# Patient Record
(demographics unavailable — no encounter records)

---

## 2024-10-22 NOTE — CURRENT PSYCHIATRIC SYMPTOMS
[Insomnia] : insomnia [Excessive Worry] : excessive worry [Depressed Mood] : no depressed mood [Anhedonia] : no anhedonia [Guilt] : not feeling guilty [Decreased Concentration] : no decrease in concentrating ability [Hyperphagia] : no hyperphagia [Hypersomnia] : no ~T hypersomnia [Psychomotor Retardation] : no psychomotor retardation [Anorexia] : no anorexia [Euphoria] : no euphoria [Highly Irritable] : no high irritability [Increased Activity] : no increased in activity [Distractibility] : not distracted [Talkativeness] : no talkativeness [Grandeur] : no feelings of grandeur [Buying Sprees] : no buying sprees [Hypersexuality] : denied hypersexuality [Dec Need For Sleep] : no decreased need for sleep [Delusions] : no ~T delusions [Hallucination Visual] : no visual hallucinations [Hallucination Auditory] : no auditory hallucinations [Hallucination Tactile] : no tactile hallucinations [Thought Disorder] : ~T a thought disorder was not noted [Ruminations] : no rumination disorder [Obsessions] : no obsessions [Re-experiencing] : no re-experiencing [Restlessness] : no restlessness [Hypochondriasis] : no hypochondriasis [Panic] : no panic disorder [de-identified] : chronic, sever [de-identified] : less anxious, no panic attacks

## 2024-10-22 NOTE — PHYSICAL EXAM
[None] : none [Normal] : good [3 - Mildly ill] : 3 - Mildly ill  (clearly established symptoms with minimal, if any, distress or difficulty in social and occupational function) [Anxious] : no anxious [Dysphoric] : not dysphoric [Constricted] : not constricted [FreeTextEntry8] : ok [FreeTextEntry9] : little dysphoric [2 - Much improved] : 2 - Much improved  (notably better with significant reduction of symptoms; increase in the level of functioning but some symptoms remain)

## 2024-10-22 NOTE — HISTORY OF PRESENT ILLNESS
[No] : no [None Known] : none known [Responsibility to family or others] : responsibility to family or others [Identifies reasons for living] : identifies reasons for living [Future oriented] : future oriented [Supportive social network or family] : supportive social network or family [High spirituality] : high spirituality [Positive therapeutic relationships] : positive therapeutic relationships [FreeTextEntry1] : This is a 61 year old patient who presents for medication management.  The patient reports fairly stable mood,  sleep is ok appetite.  The patient denies symptoms of depression,  ezekiel, or psychosis at this time.  The patient reports much less anxiety that impairs their daily functioning. The patient denies any suicidal ideation, homicidal ideation, no auditory or visual hallucinations, or paranoid ideation.  The patient has chronic medical complaints. The patient denies any drug or alcohol use, but is smoking at this time. I requested the patient's updated physical and labs from their PCP.  Coping skills were discussed and a safety plan was provided.  The patient was educated on the risks, benefits, and alternatives of their psychiatric medications.  The patient will try to reengage in psychotherapy at this time.  The patient consents to ongoing medication management.  Discussed sleep hygiene at length.  patient failed trials of trazodone, seroquel, vistaril.  Prefers to continue medications.  Due to Factor V Leiden mutation, unable to augment with risperdal/olanzapine  Patient provided PHQ9 and Perez Anxiety Scale.  8/30:patient maintains her lorazepam is causing restless leg, prefers to change back to valium, RX sent 10mg in am and 20mg at bedtime  9/13:no changes, at baseline 10/11:will try lowering celexa to 20mg, resume trazodone 100mg-200mg at bedtime for insomnia 11/8:patient will d/c celexa, raise remeron 30mg at bedtime, continue trazodone and valium 12/6:continue current medications 1/13:stable on medications, encouraged to go to ER for medical condition 2/8:see plan below for insomnia treatment 2/14:PA for zoplidem denied, will continue prn valium in day and xanax 1-2mg at bedtime for insomnia (educated that xanax will work better with good sleep hygiene and when valium is used less frequently) understand not to combine at bedtime and not to use with opiates as she has d/c opiates 3/8/23:patient maintains she can not take SSRIs, TCAs, atypicals due to side effects or treatment failure.  Patient educated that reducing anxiety with standing medication may help, but she maintains nightime prns have been only helpful options.  Discussed at great length other possibilities including depakote, prazozin, thorazine.  I also encouraged her to seek a second opinion  3/14:no improvement, now feels haldol was helpful in past, d/c thorazine and start haldol 5mg at bedtime, f/u tomorrow  3/22:haldol provided a little benefit, offrede depakote if able to do labs, states she can't at this time, will continue sleep hygiene and current medications, will increase in haldo, f/u in 3 weeks, earlier as needed, educated need to come in person by 5/11 to continue to receive controlled drugs  6/1:d/c haldol and trazodone due to side effects 6/29:stable on current medications 9/18:continue montly prns for anxiety/insomia 10/24:d/c prasozin 11/21:continue current medications, understands risks with opiates, has used concurrently, does not take any extra medication 1/22:at baseline 2/21:resume additional sleep medication, see plan 3/18:no changes [de-identified] : \par  \par
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
## 2024-10-22 NOTE — REASON FOR VISIT
[Telehealth (audio & video) - Individual/Group] : This visit was provided via telehealth using real-time 2-way audio visual technology. [Patient's space is appropriate for telehealth and maintains privacy/confidentiality.] : Patient's space is appropriate for telehealth and maintains privacy/confidentiality. [Verbal consent obtained from patient/other participant(s)] : Verbal consent for telehealth/telephonic services obtained from patient/other participant(s) [Home] : at home, [unfilled] , at the time of the visit. [Medical Office: ([facility])___] : at the medical office located in  [Verbal consent obtained from patient] : the patient, [unfilled] [FreeTextEntry4] : 300pm [FreeTextEntry5] : 315pm [FreeTextEntry2] : 6/23, ISTOP checked [FreeTextEntry3] : phone, then video [FreeTextEntry1] : "I am ok, prediabetic. I am much less anxious, chronic insomnia."

## 2024-10-22 NOTE — PLAN
[FreeTextEntry4] : mood is fairly stable, anxiety is chronic, improved sleep  insomnia is chronic, improved, due to federal law case, made many medication adjustments, minimal improvement, will continue with plan as above,   alternatives offered pending updated labs  health problems improved, has appropriate follow up  patient reports chronic severe PTSD/anxiety/insomnia, has exhausted many pharmacologic options, will continue to provide combinations to address   symptoms and encourage her to seek 2nd opinion as well

## 2024-10-22 NOTE — PAST MEDICAL HISTORY
[FreeTextEntry1] : past meds: Prozac, remeron, Abilify, Zoloft, Trazadone, Ambien, Seroquel for sleep, briefly tried Cymbalta and Zoloft, but did not tolerate and self-discontinued

## 2024-10-22 NOTE — DISCUSSION/SUMMARY
[Date of Last Physical Exam: _____] : Date of Last Physical Exam: [unfilled] [Date of Last Annual Labs: _____] : Date of Last Annual Labs: [unfilled] [Annual Review of Systems Completed?] : Annual Review of Systems Completed: Yes [Tobacco Screening Completed?] : Tobacco Screening Completed: Yes [Date of Last HbgA1c: _____] : Date of Last HbgA1c: [unfilled] [Date of Last Lipid Profile: _____] : Date of Last Lipid Profile: [unfilled] [Potential impact of patientâ??s physical health conditions on psychiatric care?] : Potential impact of patient's physical health conditions on psychiatric care: No [Does patient require any additional health services or referrals?] : Does patient require any additional health services or referrals: No

## 2024-10-22 NOTE — ASSESSMENT
[FreeTextEntry1] : Patient with chronic anxiety and insomnia due to psychosocial stressors, ongoing PTSD symptoms.  Discussed safe as possible combination of medications for insomnia as below, to add on 1 medication at a time, never take extra, and make changes in step wise approach   1. valium 10mg BID prn anxiety/insomnia 2. melatonin 5mg-10mg at bedtime 3. hydroxyzine 25mg-75mg at bedtime prn 4. alprazolam 2mg at bedtime 5.. trazodone 100mg-200mg at bedtime, not using    follow up in 4 weeks,

## 2024-10-25 NOTE — HISTORY OF PRESENT ILLNESS
[FreeTextEntry1] : patient on high dose narcotic therapy, consequently sedated also on other psych meds with similar action, now wants weight loss meds

## 2024-11-05 NOTE — HISTORY OF PRESENT ILLNESS
[de-identified] : Ms. PRUITT has a past medical history of factor V Leiden, thrombophilia, protein C and S deficiency currently under the care of hematology, along with diabetes, diabetic neuropathy, dyslipidemia, mental health disorders, and history of multiple DVT's in the past.   She has been experiencing chronic lower back pain since October 2023.  At the time she injured her back while opening a window.  After the injury she underwent conservative management.  In April 2024 she had a CAT scan and an MRI of the lumbar spine performed at [facility].  The studies were reviewed today:   - MRI / CT lumbar spine 4/2024: diffuse lumbar degenerative disease with degenerative scoliosis.  Foraminal stenosis appreciated at L1-2 and L5-S1.  Subacute/chronic compression deformity of L2 also noted.   - EMG 11/2023: diffuse sensorimotor polyneuropathy secondary to her known history of diabetes.  Recently she was seen by Dr. Rees her PCP who recommended that she restart physical therapy.  She is awaiting insurance approval to proceed.  She is on multiple medications as per her chart.  Due to her hematology condition's, she was advised by her specialist that she is at high risk for postoperative complications, therefore, surgical intervention should be only considered if there was an urgent indication to proceed.    PHYSICAL EXAM:  Constitutional: Well appearing, no distress HEENT: Normocephalic Atraumatic Psychiatric: Alert and oriented to all spheres, normal mood Pulmonary: No respiratory distress Neurologic:  CN II-XII grossly intact Palpation: + lumbar paraspinal tenderness.  Strength: Full strength in all major muscle groups Sensation: Full sensation to light touch in all extremities Reflexes:   2+ patellar  2+ ankle jerk Mild restriction in ROM  Signs: SLR negative Gait: steady, walking w/o assistance.

## 2024-11-05 NOTE — ASSESSMENT
[FreeTextEntry1] : Ms. PRUITT will continue with conservative management.   We are not considering structural surgery at this time.   I am referring her to see Dr. Bran to establish care with regards to chronic pain management at this time.   She may follow up with me as needed.  All questions answered today.    I personally reviewed with the PA, this patient's history and physical exam findings, as documented above. I have discussed the relevant areas of concern, having direct implications to the presenting problems and illnesses, and I have personally examined all pertinent and positive and negative findings, which impact their neurosurgical treatment.  MS DEBRA Ramos-C Senior Physician Assistant [facility]  Marques Mary MD FAANS Director, Complex & Adult Spinal Deformity Surgery [facility]

## 2024-11-19 NOTE — ASSESSMENT
[FreeTextEntry1] : Patient with chronic anxiety and insomnia due to psychosocial stressors, ongoing PTSD symptoms.  Discussed safe as possible combination of medications for insomnia as below, to add on 1 medication at a time, never take extra, and make changes in step wise approach   1. valium 10mg BID prn anxiety/insomnia, will d/c and replace with alprazolam only 2. melatonin 5mg-10mg at bedtime 3. hydroxyzine 25mg-75mg at bedtime prn 4. alprazolam 2mg at bedtime, change to 1mg BID prn and 2mg-4mg at bedtime   follow up in 4 weeks,

## 2024-11-19 NOTE — CURRENT PSYCHIATRIC SYMPTOMS
[Insomnia] : insomnia [Excessive Worry] : excessive worry [Depressed Mood] : no depressed mood [Anhedonia] : no anhedonia [Guilt] : not feeling guilty [Decreased Concentration] : no decrease in concentrating ability [Hyperphagia] : no hyperphagia [Hypersomnia] : no ~T hypersomnia [Psychomotor Retardation] : no psychomotor retardation [Anorexia] : no anorexia [Euphoria] : no euphoria [Highly Irritable] : no high irritability [Increased Activity] : no increased in activity [Distractibility] : not distracted [Talkativeness] : no talkativeness [Grandeur] : no feelings of grandeur [Buying Sprees] : no buying sprees [Hypersexuality] : denied hypersexuality [Dec Need For Sleep] : no decreased need for sleep [Delusions] : no ~T delusions [Hallucination Visual] : no visual hallucinations [Hallucination Auditory] : no auditory hallucinations [Hallucination Tactile] : no tactile hallucinations [Thought Disorder] : ~T a thought disorder was not noted [Ruminations] : no rumination disorder [Obsessions] : no obsessions [Re-experiencing] : no re-experiencing [Restlessness] : no restlessness [Hypochondriasis] : no hypochondriasis [Panic] : no panic disorder [de-identified] : chronic, sever [de-identified] : less anxious, no panic attacks

## 2024-11-19 NOTE — HISTORY OF PRESENT ILLNESS
[No] : no [None Known] : none known [Responsibility to family or others] : responsibility to family or others [Identifies reasons for living] : identifies reasons for living [Future oriented] : future oriented [Supportive social network or family] : supportive social network or family [High spirituality] : high spirituality [Positive therapeutic relationships] : positive therapeutic relationships [FreeTextEntry1] : This is a 62 year old patient who presents for medication management.  The patient reports fairly stable mood,  sleep is ok appetite.  The patient denies symptoms of depression,  ezekiel, or psychosis at this time.  The patient reports much less anxiety that impairs their daily functioning. The patient denies any suicidal ideation, homicidal ideation, no auditory or visual hallucinations, or paranoid ideation.  The patient has chronic medical complaints. The patient denies any drug or alcohol use, but is smoking at this time. I requested the patient's updated physical and labs from their PCP.  Coping skills were discussed and a safety plan was provided.  The patient was educated on the risks, benefits, and alternatives of their psychiatric medications.  The patient will try to reengage in psychotherapy at this time.  The patient consents to ongoing medication management.  Discussed sleep hygiene at length.  patient failed trials of trazodone, seroquel, vistaril.   Due to Factor V Leiden mutation, unable to augment with risperdal/olanzapine  Patient provided PHQ9 and Perez Anxiety Scale.  8/30:patient maintains her lorazepam is causing restless leg, prefers to change back to valium, RX sent 10mg in am and 20mg at bedtime  9/13:no changes, at baseline 10/11:will try lowering celexa to 20mg, resume trazodone 100mg-200mg at bedtime for insomnia 11/8:patient will d/c celexa, raise remeron 30mg at bedtime, continue trazodone and valium 12/6:continue current medications 1/13:stable on medications, encouraged to go to ER for medical condition 2/8:see plan below for insomnia treatment 2/14:PA for zoplidem denied, will continue prn valium in day and xanax 1-2mg at bedtime for insomnia (educated that xanax will work better with good sleep hygiene and when valium is used less frequently) understand not to combine at bedtime and not to use with opiates as she has d/c opiates 3/8/23:patient maintains she can not take SSRIs, TCAs, atypicals due to side effects or treatment failure.  Patient educated that reducing anxiety with standing medication may help, but she maintains nightime prns have been only helpful options.  Discussed at great length other possibilities including depakote, prazozin, thorazine.  I also encouraged her to seek a second opinion  3/14:no improvement, now feels haldol was helpful in past, d/c thorazine and start haldol 5mg at bedtime, f/u tomorrow  3/22:haldol provided a little benefit, sienna depakote if able to do labs, states she can't at this time, will continue sleep hygiene and current medications, will increase in haldo, f/u in 3 weeks, earlier as needed, educated need to come in person by 5/11 to continue to receive controlled drugs  6/1:d/c haldol and trazodone due to side effects 6/29:stable on current medications 9/18:continue montly prns for anxiety/insomia 10/24:d/c prasozin 11/21:continue current medications, understands risks with opiates, has used concurrently, does not take any extra medication 1/22:at baseline 2/21:resume additional sleep medication, see plan 3/18:no changes 11/19 see plan [de-identified] : \par  \par

## 2024-11-19 NOTE — REASON FOR VISIT
[Telehealth (audio & video) - Individual/Group] : This visit was provided via telehealth using real-time 2-way audio visual technology. [Patient's space is appropriate for telehealth and maintains privacy/confidentiality.] : Patient's space is appropriate for telehealth and maintains privacy/confidentiality. [Verbal consent obtained from patient/other participant(s)] : Verbal consent for telehealth/telephonic services obtained from patient/other participant(s) [Home] : at home, [unfilled] , at the time of the visit. [Medical Office: ([facility])___] : at the medical office located in  [Verbal consent obtained from patient] : the patient, [unfilled] [FreeTextEntry4] : 247pm [FreeTextEntry5] : 302pm [FreeTextEntry2] : 6/23, ISTOP checked [FreeTextEntry1] : "I am ok, had elevated liver enzymes and low HR,"

## 2024-11-19 NOTE — PHYSICAL EXAM
[None] : none [Normal] : good [3 - Mildly ill] : 3 - Mildly ill  (clearly established symptoms with minimal, if any, distress or difficulty in social and occupational function) [3 - Minimally improved] : 3 - Minimally improved  (slightly better with little or no clinically meaningful reduction of symptoms. Represents very little change in basic clinical status, level of care, or functional capacity) [Anxious] : no anxious [Dysphoric] : not dysphoric [Constricted] : not constricted [FreeTextEntry8] : ok [FreeTextEntry9] : little dysphoric

## 2024-11-19 NOTE — DISCUSSION/SUMMARY
[Date of Last Physical Exam: _____] : Date of Last Physical Exam: [unfilled] [Date of Last Annual Labs: _____] : Date of Last Annual Labs: [unfilled] [Annual Review of Systems Completed?] : Annual Review of Systems Completed: Yes [Tobacco Screening Completed?] : Tobacco Screening Completed: Yes [Date of Last HbgA1c: _____] : Date of Last HbgA1c: [unfilled] [Date of Last Lipid Profile: _____] : Date of Last Lipid Profile: [unfilled] [Potential impact of patientÃ¢Â?Â?s physical health conditions on psychiatric care?] : Potential impact of patient's physical health conditions on psychiatric care: No [Does patient require any additional health services or referrals?] : Does patient require any additional health services or referrals: No

## 2024-12-17 NOTE — PHYSICAL EXAM
[None] : none [Normal] : good [3 - Mildly ill] : 3 - Mildly ill  (clearly established symptoms with minimal, if any, distress or difficulty in social and occupational function) [Anxious] : no anxious [Dysphoric] : not dysphoric [Constricted] : not constricted [FreeTextEntry8] : ok [FreeTextEntry9] : little dysphoric [3 - Minimally improved] : 3 - Minimally improved  (slightly better with little or no clinically meaningful reduction of symptoms. Represents very little change in basic clinical status, level of care, or functional capacity)

## 2024-12-17 NOTE — HISTORY OF PRESENT ILLNESS
[No] : no [None Known] : none known [Responsibility to family or others] : responsibility to family or others [Identifies reasons for living] : identifies reasons for living [Future oriented] : future oriented [Supportive social network or family] : supportive social network or family [High spirituality] : high spirituality [Positive therapeutic relationships] : positive therapeutic relationships [FreeTextEntry1] : This is a 62 year old patient who presents for medication management.  The patient reports fairly stable mood,  sleep is ok appetite.  The patient denies symptoms of depression,  ezekiel, or psychosis at this time.  The patient reports much less anxiety that impairs their daily functioning. The patient denies any suicidal ideation, homicidal ideation, no auditory or visual hallucinations, or paranoid ideation.  The patient has chronic medical complaints. The patient denies any drug or alcohol use, but is smoking at this time. I requested the patient's updated physical and labs from their PCP.  Coping skills were discussed and a safety plan was provided.  The patient was educated on the risks, benefits, and alternatives of their psychiatric medications.  The patient will try to reengage in psychotherapy at this time.  The patient consents to ongoing medication management.  Discussed sleep hygiene at length.  patient failed trials of trazodone, seroquel, vistaril.   Due to Factor V Leiden mutation, unable to augment with risperdal/olanzapine  Patient provided PHQ9 and Perez Anxiety Scale.  8/30:patient maintains her lorazepam is causing restless leg, prefers to change back to valium, RX sent 10mg in am and 20mg at bedtime  9/13:no changes, at baseline 10/11:will try lowering celexa to 20mg, resume trazodone 100mg-200mg at bedtime for insomnia 11/8:patient will d/c celexa, raise remeron 30mg at bedtime, continue trazodone and valium 12/6:continue current medications 1/13:stable on medications, encouraged to go to ER for medical condition 2/8:see plan below for insomnia treatment 2/14:PA for zoplidem denied, will continue prn valium in day and xanax 1-2mg at bedtime for insomnia (educated that xanax will work better with good sleep hygiene and when valium is used less frequently) understand not to combine at bedtime and not to use with opiates as she has d/c opiates 3/8/23:patient maintains she can not take SSRIs, TCAs, atypicals due to side effects or treatment failure.  Patient educated that reducing anxiety with standing medication may help, but she maintains nightime prns have been only helpful options.  Discussed at great length other possibilities including depakote, prazozin, thorazine.  I also encouraged her to seek a second opinion  3/14:no improvement, now feels haldol was helpful in past, d/c thorazine and start haldol 5mg at bedtime, f/u tomorrow  3/22:haldol provided a little benefit, sienna depakote if able to do labs, states she can't at this time, will continue sleep hygiene and current medications, will increase in haldo, f/u in 3 weeks, earlier as needed, educated need to come in person by 5/11 to continue to receive controlled drugs  6/1:d/c haldol and trazodone due to side effects 6/29:stable on current medications 9/18:continue montly prns for anxiety/insomia 10/24:d/c prasozin 11/21:continue current medications, understands risks with opiates, has used concurrently, does not take any extra medication 1/22:at baseline 2/21:resume additional sleep medication, see plan 3/18:no changes 11/19 see plan 12/17:continue current meds [de-identified] : \par  \par

## 2024-12-17 NOTE — CURRENT PSYCHIATRIC SYMPTOMS
[Insomnia] : insomnia [Excessive Worry] : excessive worry [Depressed Mood] : no depressed mood [Anhedonia] : no anhedonia [Guilt] : not feeling guilty [Decreased Concentration] : no decrease in concentrating ability [Hyperphagia] : no hyperphagia [Hypersomnia] : no ~T hypersomnia [Psychomotor Retardation] : no psychomotor retardation [Anorexia] : no anorexia [Euphoria] : no euphoria [Highly Irritable] : no high irritability [Increased Activity] : no increased in activity [Distractibility] : not distracted [Talkativeness] : no talkativeness [Grandeur] : no feelings of grandeur [Buying Sprees] : no buying sprees [Hypersexuality] : denied hypersexuality [Dec Need For Sleep] : no decreased need for sleep [Delusions] : no ~T delusions [Hallucination Visual] : no visual hallucinations [Hallucination Auditory] : no auditory hallucinations [Hallucination Tactile] : no tactile hallucinations [Thought Disorder] : ~T a thought disorder was not noted [Ruminations] : no rumination disorder [Obsessions] : no obsessions [Re-experiencing] : no re-experiencing [Restlessness] : no restlessness [Hypochondriasis] : no hypochondriasis [Panic] : no panic disorder [de-identified] : chronic, sever [de-identified] : less anxious, no panic attacks

## 2024-12-17 NOTE — ASSESSMENT
[FreeTextEntry1] : Patient with chronic anxiety and insomnia due to psychosocial stressors, ongoing PTSD symptoms.  Discussed safe as possible combination of medications for insomnia as below, to add on 1 medication at a time, never take extra, and make changes in step wise approach   1. valium 10mg BID prn anxiety/insomnia was d/c 2. melatonin 5mg-10mg at bedtime 3. hydroxyzine 25mg-75mg at bedtime prn 4. alprazolam 1mg BID prn and 2mg-4mg at bedtime   follow up in 4 weeks,

## 2024-12-17 NOTE — REASON FOR VISIT
[Telehealth (audio & video) - Individual/Group] : This visit was provided via telehealth using real-time 2-way audio visual technology. [Patient's space is appropriate for telehealth and maintains privacy/confidentiality.] : Patient's space is appropriate for telehealth and maintains privacy/confidentiality. [Verbal consent obtained from patient/other participant(s)] : Verbal consent for telehealth/telephonic services obtained from patient/other participant(s) [Home] : at home, [unfilled] , at the time of the visit. [Medical Office: ([facility])___] : at the medical office located in  [Verbal consent obtained from patient] : the patient, [unfilled] [FreeTextEntry4] : 250pm [FreeTextEntry5] : 305pm [FreeTextEntry2] : 6/23, ISTOP checked [FreeTextEntry1] : "I am ok, adjusting to the changes."

## 2025-01-14 NOTE — PHYSICAL EXAM
[None] : none [Normal] : good [3 - Mildly ill] : 3 - Mildly ill  (clearly established symptoms with minimal, if any, distress or difficulty in social and occupational function) [4 - No change] : 4 - No change  (symptoms remain essentially unchanged) [Anxious] : no anxious [Dysphoric] : not dysphoric [Constricted] : not constricted [FreeTextEntry8] : ok [FreeTextEntry9] : little dysphoric

## 2025-01-14 NOTE — CURRENT PSYCHIATRIC SYMPTOMS
[Insomnia] : insomnia [Excessive Worry] : excessive worry [Depressed Mood] : no depressed mood [Anhedonia] : no anhedonia [Guilt] : not feeling guilty [Decreased Concentration] : no decrease in concentrating ability [Hyperphagia] : no hyperphagia [Hypersomnia] : no ~T hypersomnia [Psychomotor Retardation] : no psychomotor retardation [Anorexia] : no anorexia [Euphoria] : no euphoria [Highly Irritable] : no high irritability [Increased Activity] : no increased in activity [Distractibility] : not distracted [Talkativeness] : no talkativeness [Grandeur] : no feelings of grandeur [Buying Sprees] : no buying sprees [Hypersexuality] : denied hypersexuality [Dec Need For Sleep] : no decreased need for sleep [Delusions] : no ~T delusions [Hallucination Visual] : no visual hallucinations [Hallucination Auditory] : no auditory hallucinations [Hallucination Tactile] : no tactile hallucinations [Thought Disorder] : ~T a thought disorder was not noted [Ruminations] : no rumination disorder [Obsessions] : no obsessions [Re-experiencing] : no re-experiencing [Restlessness] : no restlessness [Hypochondriasis] : no hypochondriasis [Panic] : no panic disorder [de-identified] : chronic, sever [de-identified] : less anxious, no panic attacks

## 2025-01-14 NOTE — ASSESSMENT
[FreeTextEntry1] : Patient with chronic anxiety and insomnia due to psychosocial stressors, ongoing PTSD symptoms.  Discussed safe as possible combination of medications for insomnia as below, to add on 1 medication at a time, never take extra, and make changes in step wise approach   1. melatonin 5mg-10mg at bedtime 2. hydroxyzine 25mg-75mg at bedtime prn 3. alprazolam 1mg BID prn and 2mg-4mg at bedtime   follow up in 4 weeks,

## 2025-01-14 NOTE — HISTORY OF PRESENT ILLNESS
[No] : no [None Known] : none known [Responsibility to family or others] : responsibility to family or others [Identifies reasons for living] : identifies reasons for living [Future oriented] : future oriented [Supportive social network or family] : supportive social network or family [High spirituality] : high spirituality [Positive therapeutic relationships] : positive therapeutic relationships [FreeTextEntry1] : This is a 62 year old patient who presents for medication management.  The patient reports fairly stable mood,  sleep is ok appetite.  The patient denies symptoms of depression,  ezekiel, or psychosis at this time.  The patient reports much less anxiety that impairs their daily functioning. The patient denies any suicidal ideation, homicidal ideation, no auditory or visual hallucinations, or paranoid ideation.  The patient has chronic medical complaints. The patient denies any drug or alcohol use, but is smoking at this time. I requested the patient's updated physical and labs from their PCP.  Coping skills were discussed and a safety plan was provided.  The patient was educated on the risks, benefits, and alternatives of their psychiatric medications.  The patient will try to reengage in psychotherapy at this time.  The patient consents to ongoing medication management.  Discussed sleep hygiene at length.  patient failed trials of trazodone, seroquel, vistaril.   Due to Factor V Leiden mutation, unable to augment with risperdal/olanzapine  Patient provided PHQ9 and Perez Anxiety Scale.  8/30:patient maintains her lorazepam is causing restless leg, prefers to change back to valium, RX sent 10mg in am and 20mg at bedtime  9/13:no changes, at baseline 10/11:will try lowering celexa to 20mg, resume trazodone 100mg-200mg at bedtime for insomnia 11/8:patient will d/c celexa, raise remeron 30mg at bedtime, continue trazodone and valium 12/6:continue current medications 1/13:stable on medications, encouraged to go to ER for medical condition 2/8:see plan below for insomnia treatment 2/14:PA for zoplidem denied, will continue prn valium in day and xanax 1-2mg at bedtime for insomnia (educated that xanax will work better with good sleep hygiene and when valium is used less frequently) understand not to combine at bedtime and not to use with opiates as she has d/c opiates 3/8/23:patient maintains she can not take SSRIs, TCAs, atypicals due to side effects or treatment failure.  Patient educated that reducing anxiety with standing medication may help, but she maintains nightime prns have been only helpful options.  Discussed at great length other possibilities including depakote, prazozin, thorazine.  I also encouraged her to seek a second opinion  3/14:no improvement, now feels haldol was helpful in past, d/c thorazine and start haldol 5mg at bedtime, f/u tomorrow  3/22:haldol provided a little benefit, sienna depakote if able to do labs, states she can't at this time, will continue sleep hygiene and current medications, will increase in haldo, f/u in 3 weeks, earlier as needed, educated need to come in person by 5/11 to continue to receive controlled drugs  6/1:d/c haldol and trazodone due to side effects 6/29:stable on current medications 9/18:continue montly prns for anxiety/insomia 10/24:d/c prasozin 11/21:continue current medications, understands risks with opiates, has used concurrently, does not take any extra medication 1/22:at baseline 2/21:resume additional sleep medication, see plan 3/18:no changes 11/19 see plan 12/17:continue current meds 1/14:no changes, emailed GAD7/PHQ9 [de-identified] : \par  \par

## 2025-01-14 NOTE — REASON FOR VISIT
[Telehealth (audio & video) - Individual/Group] : This visit was provided via telehealth using real-time 2-way audio visual technology. [Patient's space is appropriate for telehealth and maintains privacy/confidentiality.] : Patient's space is appropriate for telehealth and maintains privacy/confidentiality. [Verbal consent obtained from patient/other participant(s)] : Verbal consent for telehealth/telephonic services obtained from patient/other participant(s) [Home] : at home, [unfilled] , at the time of the visit. [Medical Office: ([facility])___] : at the medical office located in  [Verbal consent obtained from patient] : the patient, [unfilled] [FreeTextEntry4] : 250pm [FreeTextEntry5] : 305pm [FreeTextEntry2] : 6/23, ISTOP checked [FreeTextEntry1] : "I am ok, have bad cold."

## 2025-01-17 NOTE — BEGINNING OF VISIT
The patient is a pleasant 73-year-old gentleman history of seizures.  The patient has done  well with no further recurrence.  The patient was seen last year and at that time had been stable.  He continues with the lamotrigine 100 mg p.o. b.i.d.    Blood pressure 132/80, pulse 73, resp. rate 12, height 5' 6\" (1.676 m), weight 75.8 kg (167 lb), SpO2 98 %.    The patient is awake alert and pleasant.  He is oriented x4.  His speech and language are normal.  Cranial nerves 2-12 intact.  Fundi benign.  Motor exam shows 5/5 strength in both upper lower extremities.  Coordination and gait are normal.  Romberg is absent.    Impression:  This is a pleasant 73-year-old gentleman history of seizure disorder.  This point I would continue with the lamotrigine 100 mg p.o. b.i.d..  If all goes well I plan on seeing him  back in 1 year for follow-up.   [0] : 2) Feeling down, depressed, or hopeless: Not at all (0) [PHQ-2 Negative] : PHQ-2 Negative [[id number]] : 0 [Pain Scale: ___] : On a scale of 1-10, today the patient's pain is a(n) [unfilled]. [Current] : Current [Reviewed, no changes] : Reviewed, no changes

## 2025-01-17 NOTE — BEGINNING OF VISIT
[0] : 2) Feeling down, depressed, or hopeless: Not at all (0) [PHQ-2 Negative] : PHQ-2 Negative [[id number]] : 0 [Pain Scale: ___] : On a scale of 1-10, today the patient's pain is a(n) [unfilled]. [Current] : Current [Reviewed, no changes] : Reviewed, no changes

## 2025-01-17 NOTE — BEGINNING OF VISIT
[0] : 2) Feeling down, depressed, or hopeless: Not at all (0) [PHQ-2 Negative] : PHQ-2 Negative [[id number]] : 0 [Pain Scale: ___] : On a scale of 1-10, today the patient's pain is a(n) [unfilled]. [Current] : Current [Reviewed, no changes] : Reviewed, no changes

## 2025-01-17 NOTE — BEGINNING OF VISIT
[0] : 2) Feeling down, depressed, or hopeless: Not at all (0) [PHQ-2 Negative] : PHQ-2 Negative [[id number]] : 0 [Pain Scale: ___] : On a scale of 1-10, today the patient's pain is a(n) [unfilled]. [Current] : Current [Reviewed, no changes] : Reviewed, no changes

## 2025-01-18 NOTE — HISTORY OF PRESENT ILLNESS
[Disease:__________________________] : Disease: [unfilled] [de-identified] : The patient is coming for a follow up for her thrombophilia. She is feeling well overall, admits to being nervous about her "new clots". Apparently she had LE US done at a facility in [place] and was told to see her hematologist "because she was developing new clots". Not clear what the specific reasons were for the examination. Recent US from 4/24 and 12/24 reviewed and showed no evidence of acute DVT.  In fact, her US from 2011 read exactly the same. She remains on Pradaxa 150 mg daily.   The patient is up to date with mammogram (done 9/24 WNL), and PAP, colonoscopy scheduled for April 2025.     [Date: ____________] : Patient's last distress assessment performed on [unfilled]. [5 - Distress Level] : Distress Level: 5 [ECOG Performance Status: 1 - Restricted in physically strenuous activity but ambulatory and able to carry out work of a light or sedentary nature] : Performance Status: 1 - Restricted in physically strenuous activity but ambulatory and able to carry out work of a light or sedentary nature, e.g., light house work, office work

## 2025-01-18 NOTE — REASON FOR VISIT
[Follow-Up Visit] : a follow-up visit for [Family Member] : family member [FreeTextEntry2] : Thrombophilia

## 2025-01-18 NOTE — END OF VISIT
[] : Fellow [FreeTextEntry3] : I was present with the PA during the key portions of the history and exam. I agree with the findings and plan as documented in the PA's note, unless noted below.\par

## 2025-01-18 NOTE — PHYSICAL EXAM
[Restricted in physically strenuous activity but ambulatory and able to carry out work of a light or sedentary nature] : Status 1- Restricted in physically strenuous activity but ambulatory and able to carry out work of a light or sedentary nature, e.g., light house work, office work [Obese] : obese [Normal] : affect appropriate [de-identified] : +Clubbing, decreased BS

## 2025-01-18 NOTE — REVIEW OF SYSTEMS
[Fatigue] : fatigue [Vision Problems] : vision problems [SOB on Exertion] : shortness of breath during exertion [Joint Pain] : joint pain [Dizziness] : dizziness [Insomnia] : insomnia [Depression] : depression [Easy Bruising] : a tendency for easy bruising [Negative] : Endocrine [Recent Change In Weight] : ~T no recent weight change [Incontinence] : no incontinence [FreeTextEntry9] : Back, legs [de-identified] : Under her psychiatrist's care

## 2025-01-18 NOTE — HISTORY OF PRESENT ILLNESS
[Disease:__________________________] : Disease: [unfilled] [de-identified] : The patient is coming for a follow up for her thrombophilia. She is feeling well overall, admits to being nervous about her "new clots". Apparently she had LE US done at a facility in [place] and was told to see her hematologist "because she was developing new clots". Not clear what the specific reasons were for the examination. Recent US from 4/24 and 12/24 reviewed and showed no evidence of acute DVT.  In fact, her US from 2011 read exactly the same. She remains on Pradaxa 150 mg daily.   The patient is up to date with mammogram (done 9/24 WNL), and PAP, colonoscopy scheduled for April 2025.     [Date: ____________] : Patient's last distress assessment performed on [unfilled]. [5 - Distress Level] : Distress Level: 5 [ECOG Performance Status: 1 - Restricted in physically strenuous activity but ambulatory and able to carry out work of a light or sedentary nature] : Performance Status: 1 - Restricted in physically strenuous activity but ambulatory and able to carry out work of a light or sedentary nature, e.g., light house work, office work

## 2025-01-18 NOTE — PHYSICAL EXAM
[Restricted in physically strenuous activity but ambulatory and able to carry out work of a light or sedentary nature] : Status 1- Restricted in physically strenuous activity but ambulatory and able to carry out work of a light or sedentary nature, e.g., light house work, office work [Obese] : obese [Normal] : affect appropriate [de-identified] : +Clubbing, decreased BS

## 2025-01-18 NOTE — HISTORY OF PRESENT ILLNESS
[Disease:__________________________] : Disease: [unfilled] [de-identified] : The patient is coming for a follow up for her thrombophilia. She is feeling well overall, admits to being nervous about her "new clots". Apparently she had LE US done at a facility in [place] and was told to see her hematologist "because she was developing new clots". Not clear what the specific reasons were for the examination. Recent US from 4/24 and 12/24 reviewed and showed no evidence of acute DVT.  In fact, her US from 2011 read exactly the same. She remains on Pradaxa 150 mg daily.   The patient is up to date with mammogram (done 9/24 WNL), and PAP, colonoscopy scheduled for April 2025.     [Date: ____________] : Patient's last distress assessment performed on [unfilled]. [5 - Distress Level] : Distress Level: 5 [ECOG Performance Status: 1 - Restricted in physically strenuous activity but ambulatory and able to carry out work of a light or sedentary nature] : Performance Status: 1 - Restricted in physically strenuous activity but ambulatory and able to carry out work of a light or sedentary nature, e.g., light house work, office work

## 2025-01-18 NOTE — PHYSICAL EXAM
[Restricted in physically strenuous activity but ambulatory and able to carry out work of a light or sedentary nature] : Status 1- Restricted in physically strenuous activity but ambulatory and able to carry out work of a light or sedentary nature, e.g., light house work, office work [Obese] : obese [Normal] : affect appropriate [de-identified] : +Clubbing, decreased BS

## 2025-01-18 NOTE — PHYSICAL EXAM
[Restricted in physically strenuous activity but ambulatory and able to carry out work of a light or sedentary nature] : Status 1- Restricted in physically strenuous activity but ambulatory and able to carry out work of a light or sedentary nature, e.g., light house work, office work [Obese] : obese [Normal] : affect appropriate [de-identified] : +Clubbing, decreased BS

## 2025-01-18 NOTE — ASSESSMENT
[FreeTextEntry1] : 1. 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 She also had low protein ?C (which was normal on repeat testing), ?low protein S activity, but circumstances of the blood drawing not clear (whether on anticoagulation with Coumadin or not). At one time, she also had positive antiphospholipid antibodies.  The patient seems to be stable on Pradaxa which she is tolerating well with no new thromboembolic events.  2. Overweight, Patient was prescribed Ozempic, not yet started.  The patient has many medical, psychiatric and social issues.   She will keep all her appointments with all her other physicians. Labs today: repeat phospholipid antibodies RTC 1 year, or sooner if needed.  Case discussed and patient seen with Dr. Falcon who agreed with the above.

## 2025-01-18 NOTE — REVIEW OF SYSTEMS
[Fatigue] : fatigue [Vision Problems] : vision problems [SOB on Exertion] : shortness of breath during exertion [Joint Pain] : joint pain [Dizziness] : dizziness [Insomnia] : insomnia [Depression] : depression [Easy Bruising] : a tendency for easy bruising [Negative] : Endocrine [Recent Change In Weight] : ~T no recent weight change [Incontinence] : no incontinence [FreeTextEntry9] : Back, legs [de-identified] : Under her psychiatrist's care

## 2025-01-18 NOTE — REVIEW OF SYSTEMS
[Fatigue] : fatigue [Vision Problems] : vision problems [SOB on Exertion] : shortness of breath during exertion [Joint Pain] : joint pain [Dizziness] : dizziness [Insomnia] : insomnia [Depression] : depression [Easy Bruising] : a tendency for easy bruising [Negative] : Endocrine [Recent Change In Weight] : ~T no recent weight change [Incontinence] : no incontinence [FreeTextEntry9] : Back, legs [de-identified] : Under her psychiatrist's care

## 2025-01-18 NOTE — HISTORY OF PRESENT ILLNESS
[Disease:__________________________] : Disease: [unfilled] [de-identified] : The patient is coming for a follow up for her thrombophilia. She is feeling well overall, admits to being nervous about her "new clots". Apparently she had LE US done at a facility in [place] and was told to see her hematologist "because she was developing new clots". Not clear what the specific reasons were for the examination. Recent US from 4/24 and 12/24 reviewed and showed no evidence of acute DVT.  In fact, her US from 2011 read exactly the same. She remains on Pradaxa 150 mg daily.   The patient is up to date with mammogram (done 9/24 WNL), and PAP, colonoscopy scheduled for April 2025.     [Date: ____________] : Patient's last distress assessment performed on [unfilled]. [5 - Distress Level] : Distress Level: 5 [ECOG Performance Status: 1 - Restricted in physically strenuous activity but ambulatory and able to carry out work of a light or sedentary nature] : Performance Status: 1 - Restricted in physically strenuous activity but ambulatory and able to carry out work of a light or sedentary nature, e.g., light house work, office work

## 2025-03-09 NOTE — REASON FOR VISIT
[Telehealth (audio & video) - Individual/Group] : This visit was provided via telehealth using real-time 2-way audio visual technology. [Patient's space is appropriate for telehealth and maintains privacy/confidentiality.] : Patient's space is appropriate for telehealth and maintains privacy/confidentiality. [Verbal consent obtained from patient/other participant(s)] : Verbal consent for telehealth/telephonic services obtained from patient/other participant(s) [Home] : at home, [unfilled] , at the time of the visit. [Medical Office: ([facility])___] : at the medical office located in  [Verbal consent obtained from patient] : the patient, [unfilled] [FreeTextEntry4] : 340pm [FreeTextEntry2] : 6/23, ISTOP checked [FreeTextEntry5] : 400pm [FreeTextEntry3] : phone, then video [FreeTextEntry1] : "I want to change back to valium."

## 2025-03-09 NOTE — ASSESSMENT
[FreeTextEntry1] : Patient with chronic anxiety and insomnia due to psychosocial stressors, ongoing PTSD symptoms.  Discussed safe as possible combination of medications for insomnia as below, to add on 1 medication at a time, never take extra, and make changes in step wise approach   1. melatonin 5mg-10mg at bedtime 2. hydroxyzine 25mg-75mg at bedtime prn 3. alprazolam 1mg BID prn and 2mg-4mg at bedtime, will d/c and try lorazepam 2mg-4mg at bedtime (may use 1m daily prn if needed   follow up in 4 weeks,

## 2025-03-09 NOTE — REASON FOR VISIT
[Telehealth (audio & video) - Individual/Group] : This visit was provided via telehealth using real-time 2-way audio visual technology. [Patient's space is appropriate for telehealth and maintains privacy/confidentiality.] : Patient's space is appropriate for telehealth and maintains privacy/confidentiality. [Verbal consent obtained from patient/other participant(s)] : Verbal consent for telehealth/telephonic services obtained from patient/other participant(s) [Home] : at home, [unfilled] , at the time of the visit. [Medical Office: ([facility])___] : at the medical office located in  [Verbal consent obtained from patient] : the patient, [unfilled] [FreeTextEntry4] : 340pm [FreeTextEntry2] : 6/23, ISTOP checked [FreeTextEntry5] : 400pm [FreeTextEntry3] : phone, then video [FreeTextEntry1] : "I want to change back to valium."

## 2025-03-09 NOTE — CURRENT PSYCHIATRIC SYMPTOMS
[Insomnia] : insomnia [Excessive Worry] : excessive worry [Depressed Mood] : no depressed mood [Anhedonia] : no anhedonia [Guilt] : not feeling guilty [Decreased Concentration] : no decrease in concentrating ability [Hyperphagia] : no hyperphagia [Hypersomnia] : no ~T hypersomnia [Psychomotor Retardation] : no psychomotor retardation [Euphoria] : no euphoria [Anorexia] : no anorexia [Highly Irritable] : no high irritability [Increased Activity] : no increased in activity [Distractibility] : not distracted [Talkativeness] : no talkativeness [Grandeur] : no feelings of grandeur [Buying Sprees] : no buying sprees [Hypersexuality] : denied hypersexuality [Dec Need For Sleep] : no decreased need for sleep [Delusions] : no ~T delusions [Hallucination Visual] : no visual hallucinations [Hallucination Auditory] : no auditory hallucinations [Hallucination Tactile] : no tactile hallucinations [Thought Disorder] : ~T a thought disorder was not noted [Ruminations] : no rumination disorder [Obsessions] : no obsessions [Re-experiencing] : no re-experiencing [Restlessness] : no restlessness [Hypochondriasis] : no hypochondriasis [Panic] : no panic disorder [de-identified] : chronic, sever [de-identified] : less anxious, no panic attacks

## 2025-03-09 NOTE — HISTORY OF PRESENT ILLNESS
[No] : no [None Known] : none known [Responsibility to family or others] : responsibility to family or others [Identifies reasons for living] : identifies reasons for living [Future oriented] : future oriented [Supportive social network or family] : supportive social network or family [High spirituality] : high spirituality [Positive therapeutic relationships] : positive therapeutic relationships [FreeTextEntry1] : This is a 62 year old patient who presents for medication management.  The patient reports fairly stable mood,  sleep is ok appetite.  The patient denies symptoms of depression,  ezekiel, or psychosis at this time.  The patient reports much less anxiety that impairs their daily functioning. The patient denies any suicidal ideation, homicidal ideation, no auditory or visual hallucinations, or paranoid ideation.  The patient has chronic medical complaints. The patient denies any drug or alcohol use, but is smoking at this time. I requested the patient's updated physical and labs from their PCP.  Coping skills were discussed and a safety plan was provided.  The patient was educated on the risks, benefits, and alternatives of their psychiatric medications.  The patient will try to reengage in psychotherapy at this time.  The patient consents to ongoing medication management.  Discussed sleep hygiene at length.  patient failed trials of trazodone, seroquel, vistaril.   Due to Factor V Leiden mutation, unable to augment with risperdal/olanzapine  Patient provided PHQ9 and Perez Anxiety Scale.  8/30:patient maintains her lorazepam is causing restless leg, prefers to change back to valium, RX sent 10mg in am and 20mg at bedtime  9/13:no changes, at baseline 10/11:will try lowering celexa to 20mg, resume trazodone 100mg-200mg at bedtime for insomnia 11/8:patient will d/c celexa, raise remeron 30mg at bedtime, continue trazodone and valium 12/6:continue current medications 1/13:stable on medications, encouraged to go to ER for medical condition 2/8:see plan below for insomnia treatment 2/14:PA for zoplidem denied, will continue prn valium in day and xanax 1-2mg at bedtime for insomnia (educated that xanax will work better with good sleep hygiene and when valium is used less frequently) understand not to combine at bedtime and not to use with opiates as she has d/c opiates 3/8/23:patient maintains she can not take SSRIs, TCAs, atypicals due to side effects or treatment failure.  Patient educated that reducing anxiety with standing medication may help, but she maintains nightime prns have been only helpful options.  Discussed at great length other possibilities including depakote, prazozin, thorazine.  I also encouraged her to seek a second opinion  3/14:no improvement, now feels haldol was helpful in past, d/c thorazine and start haldol 5mg at bedtime, f/u tomorrow  3/22:haldol provided a little benefit, offrede depakote if able to do labs, states she can't at this time, will continue sleep hygiene and current medications, will increase in haldo, f/u in 3 weeks, earlier as needed, educated need to come in person by 5/11 to continue to receive controlled drugs  6/1:d/c haldol and trazodone due to side effects 6/29:stable on current medications 9/18:continue montly prns for anxiety/insomia 10/24:d/c prasozin 11/21:continue current medications, understands risks with opiates, has used concurrently, does not take any extra medication 1/22:at baseline 2/21:resume additional sleep medication, see plan 3/18:no changes 11/19 see plan 12/17:continue current meds 1/14:no changes, emailed GAD7/PHQ9 2/11:expaine patient just filled 90 2mg xanax 6 days ago, will change to valium at end of month, when next RX is due 3/3:diescussed options considering patient has resume opiates, eventhough she has taken benzos and opiates for many years, we want to try benzo with least risk, discussed option of lorazepam or temazepam or continuing xanax, valium and klonopin should be avoided, patient agreed to 3 day trial of lorazepam, will call on this Friday 3/9:patient failed trial of lorazepam, did not want to try temazepam at this time, understands risks of benzo use with opiates, had been on both in past many years, agree to resume alprazom 2mg po TD prn, discussed safe use at length [de-identified] : \par  \par

## 2025-03-09 NOTE — HISTORY OF PRESENT ILLNESS
[No] : no [None Known] : none known [Responsibility to family or others] : responsibility to family or others [Identifies reasons for living] : identifies reasons for living [Future oriented] : future oriented [Supportive social network or family] : supportive social network or family [High spirituality] : high spirituality [Positive therapeutic relationships] : positive therapeutic relationships [FreeTextEntry1] : This is a 62 year old patient who presents for medication management.  The patient reports fairly stable mood,  sleep is ok appetite.  The patient denies symptoms of depression,  ezekiel, or psychosis at this time.  The patient reports much less anxiety that impairs their daily functioning. The patient denies any suicidal ideation, homicidal ideation, no auditory or visual hallucinations, or paranoid ideation.  The patient has chronic medical complaints. The patient denies any drug or alcohol use, but is smoking at this time. I requested the patient's updated physical and labs from their PCP.  Coping skills were discussed and a safety plan was provided.  The patient was educated on the risks, benefits, and alternatives of their psychiatric medications.  The patient will try to reengage in psychotherapy at this time.  The patient consents to ongoing medication management.  Discussed sleep hygiene at length.  patient failed trials of trazodone, seroquel, vistaril.   Due to Factor V Leiden mutation, unable to augment with risperdal/olanzapine  Patient provided PHQ9 and Perez Anxiety Scale.  8/30:patient maintains her lorazepam is causing restless leg, prefers to change back to valium, RX sent 10mg in am and 20mg at bedtime  9/13:no changes, at baseline 10/11:will try lowering celexa to 20mg, resume trazodone 100mg-200mg at bedtime for insomnia 11/8:patient will d/c celexa, raise remeron 30mg at bedtime, continue trazodone and valium 12/6:continue current medications 1/13:stable on medications, encouraged to go to ER for medical condition 2/8:see plan below for insomnia treatment 2/14:PA for zoplidem denied, will continue prn valium in day and xanax 1-2mg at bedtime for insomnia (educated that xanax will work better with good sleep hygiene and when valium is used less frequently) understand not to combine at bedtime and not to use with opiates as she has d/c opiates 3/8/23:patient maintains she can not take SSRIs, TCAs, atypicals due to side effects or treatment failure.  Patient educated that reducing anxiety with standing medication may help, but she maintains nightime prns have been only helpful options.  Discussed at great length other possibilities including depakote, prazozin, thorazine.  I also encouraged her to seek a second opinion  3/14:no improvement, now feels haldol was helpful in past, d/c thorazine and start haldol 5mg at bedtime, f/u tomorrow  3/22:haldol provided a little benefit, offrede depakote if able to do labs, states she can't at this time, will continue sleep hygiene and current medications, will increase in haldo, f/u in 3 weeks, earlier as needed, educated need to come in person by 5/11 to continue to receive controlled drugs  6/1:d/c haldol and trazodone due to side effects 6/29:stable on current medications 9/18:continue montly prns for anxiety/insomia 10/24:d/c prasozin 11/21:continue current medications, understands risks with opiates, has used concurrently, does not take any extra medication 1/22:at baseline 2/21:resume additional sleep medication, see plan 3/18:no changes 11/19 see plan 12/17:continue current meds 1/14:no changes, emailed GAD7/PHQ9 2/11:expaine patient just filled 90 2mg xanax 6 days ago, will change to valium at end of month, when next RX is due 3/3:diescussed options considering patient has resume opiates, eventhough she has taken benzos and opiates for many years, we want to try benzo with least risk, discussed option of lorazepam or temazepam or continuing xanax, valium and klonopin should be avoided, patient agreed to 3 day trial of lorazepam, will call on this Friday 3/9:patient failed trial of lorazepam, did not want to try temazepam at this time, understands risks of benzo use with opiates, had been on both in past many years, agree to resume alprazom 2mg po TD prn, discussed safe use at length [de-identified] : \par  \par

## 2025-03-09 NOTE — CURRENT PSYCHIATRIC SYMPTOMS
[Insomnia] : insomnia [Excessive Worry] : excessive worry [Depressed Mood] : no depressed mood [Anhedonia] : no anhedonia [Guilt] : not feeling guilty [Decreased Concentration] : no decrease in concentrating ability [Hyperphagia] : no hyperphagia [Hypersomnia] : no ~T hypersomnia [Psychomotor Retardation] : no psychomotor retardation [Euphoria] : no euphoria [Anorexia] : no anorexia [Highly Irritable] : no high irritability [Increased Activity] : no increased in activity [Distractibility] : not distracted [Talkativeness] : no talkativeness [Grandeur] : no feelings of grandeur [Buying Sprees] : no buying sprees [Hypersexuality] : denied hypersexuality [Dec Need For Sleep] : no decreased need for sleep [Delusions] : no ~T delusions [Hallucination Visual] : no visual hallucinations [Hallucination Auditory] : no auditory hallucinations [Hallucination Tactile] : no tactile hallucinations [Thought Disorder] : ~T a thought disorder was not noted [Ruminations] : no rumination disorder [Obsessions] : no obsessions [Re-experiencing] : no re-experiencing [Restlessness] : no restlessness [Hypochondriasis] : no hypochondriasis [Panic] : no panic disorder [de-identified] : chronic, sever [de-identified] : less anxious, no panic attacks

## 2025-03-09 NOTE — PHYSICAL EXAM
[None] : none [Normal] : good [3 - Mildly ill] : 3 - Mildly ill  (clearly established symptoms with minimal, if any, distress or difficulty in social and occupational function) [3 - Minimally improved] : 3 - Minimally improved  (slightly better with little or no clinically meaningful reduction of symptoms. Represents very little change in basic clinical status, level of care, or functional capacity) [Anxious] : no anxious [Dysphoric] : not dysphoric [FreeTextEntry8] : ok [Constricted] : not constricted [FreeTextEntry9] : little dysphoric

## 2025-03-12 NOTE — REVIEW OF SYSTEMS
Patient with LLQ pain, vomiting, constipation. Will check labs, CTAP, reassess. patient declined pain medicine.
[Negative] : Heme/Lymph

## 2025-03-31 NOTE — PHYSICAL EXAM
[None] : none [Normal] : good [3 - Mildly ill] : 3 - Mildly ill  (clearly established symptoms with minimal, if any, distress or difficulty in social and occupational function) [Anxious] : no anxious [Dysphoric] : not dysphoric [Constricted] : not constricted [FreeTextEntry8] : ok [FreeTextEntry9] : little dysphoric [4 - No change] : 4 - No change  (symptoms remain essentially unchanged)

## 2025-03-31 NOTE — CURRENT PSYCHIATRIC SYMPTOMS
[Insomnia] : insomnia [Excessive Worry] : excessive worry [Depressed Mood] : no depressed mood [Anhedonia] : no anhedonia [Guilt] : not feeling guilty [Decreased Concentration] : no decrease in concentrating ability [Hyperphagia] : no hyperphagia [Hypersomnia] : no ~T hypersomnia [Psychomotor Retardation] : no psychomotor retardation [Anorexia] : no anorexia [Euphoria] : no euphoria [Highly Irritable] : no high irritability [Increased Activity] : no increased in activity [Distractibility] : not distracted [Talkativeness] : no talkativeness [Grandeur] : no feelings of grandeur [Buying Sprees] : no buying sprees [Hypersexuality] : denied hypersexuality [Dec Need For Sleep] : no decreased need for sleep [Delusions] : no ~T delusions [Hallucination Visual] : no visual hallucinations [Hallucination Auditory] : no auditory hallucinations [Hallucination Tactile] : no tactile hallucinations [Thought Disorder] : ~T a thought disorder was not noted [Ruminations] : no rumination disorder [Obsessions] : no obsessions [Re-experiencing] : no re-experiencing [Restlessness] : no restlessness [Hypochondriasis] : no hypochondriasis [Panic] : no panic disorder [de-identified] : chronic, sever [de-identified] : less anxious, no panic attacks

## 2025-03-31 NOTE — HISTORY OF PRESENT ILLNESS
[No] : no [None Known] : none known [Responsibility to family or others] : responsibility to family or others [Identifies reasons for living] : identifies reasons for living [Future oriented] : future oriented [Supportive social network or family] : supportive social network or family [High spirituality] : high spirituality [Positive therapeutic relationships] : positive therapeutic relationships [FreeTextEntry1] : This is a 62 year old patient who presents for medication management.  The patient reports fairly stable mood,  sleep is ok appetite.  The patient denies symptoms of depression,  ezekiel, or psychosis at this time.  The patient reports much less anxiety that impairs their daily functioning. The patient denies any suicidal ideation, homicidal ideation, no auditory or visual hallucinations, or paranoid ideation.  The patient has chronic medical complaints. The patient denies any drug or alcohol use, but is smoking at this time. I requested the patient's updated physical and labs from their PCP.  Coping skills were discussed and a safety plan was provided.  The patient was educated on the risks, benefits, and alternatives of their psychiatric medications.  The patient will try to reengage in psychotherapy at this time.  The patient consents to ongoing medication management.  Discussed sleep hygiene at length.  patient failed trials of trazodone, seroquel, vistaril.   Due to Factor V Leiden mutation, unable to augment with risperdal/olanzapine  Patient provided PHQ9 and Perez Anxiety Scale.  8/30:patient maintains her lorazepam is causing restless leg, prefers to change back to valium, RX sent 10mg in am and 20mg at bedtime  9/13:no changes, at baseline 10/11:will try lowering celexa to 20mg, resume trazodone 100mg-200mg at bedtime for insomnia 11/8:patient will d/c celexa, raise remeron 30mg at bedtime, continue trazodone and valium 12/6:continue current medications 1/13:stable on medications, encouraged to go to ER for medical condition 2/8:see plan below for insomnia treatment 2/14:PA for zoplidem denied, will continue prn valium in day and xanax 1-2mg at bedtime for insomnia (educated that xanax will work better with good sleep hygiene and when valium is used less frequently) understand not to combine at bedtime and not to use with opiates as she has d/c opiates 3/8/23:patient maintains she can not take SSRIs, TCAs, atypicals due to side effects or treatment failure.  Patient educated that reducing anxiety with standing medication may help, but she maintains nightime prns have been only helpful options.  Discussed at great length other possibilities including depakote, prazozin, thorazine.  I also encouraged her to seek a second opinion  3/14:no improvement, now feels haldol was helpful in past, d/c thorazine and start haldol 5mg at bedtime, f/u tomorrow  3/22:haldol provided a little benefit, offrede depakote if able to do labs, states she can't at this time, will continue sleep hygiene and current medications, will increase in haldo, f/u in 3 weeks, earlier as needed, educated need to come in person by 5/11 to continue to receive controlled drugs  6/1:d/c haldol and trazodone due to side effects 6/29:stable on current medications 9/18:continue montly prns for anxiety/insomia 10/24:d/c prasozin 11/21:continue current medications, understands risks with opiates, has used concurrently, does not take any extra medication 1/22:at baseline 2/21:resume additional sleep medication, see plan 3/18:no changes 11/19 see plan 12/17:continue current meds 1/14:no changes, emailed GAD7/PHQ9 2/11:expaine patient just filled 90 2mg xanax 6 days ago, will change to valium at end of month, when next RX is due 3/3:diescussed options considering patient has resume opiates, eventhough she has taken benzos and opiates for many years, we want to try benzo with least risk, discussed option of lorazepam or temazepam or continuing xanax, valium and klonopin should be avoided, patient agreed to 3 day trial of lorazepam, will call on this Friday 3/9:patient failed trial of lorazepam, did not want to try temazepam at this time, understands risks of benzo use with opiates, had been on both in past many years, agree to resume alprazom 2mg po TD prn, discussed safe use at length 3/31: patient uses xanax prn for sleep and occasional prn, requests therapy again, referral made [de-identified] : \par  \par

## 2025-03-31 NOTE — REASON FOR VISIT
[Telehealth (audio & video) - Individual/Group] : This visit was provided via telehealth using real-time 2-way audio visual technology. [Patient's space is appropriate for telehealth and maintains privacy/confidentiality.] : Patient's space is appropriate for telehealth and maintains privacy/confidentiality. [Verbal consent obtained from patient/other participant(s)] : Verbal consent for telehealth/telephonic services obtained from patient/other participant(s) [Home] : at home, [unfilled] , at the time of the visit. [Medical Office: ([facility])___] : at the medical office located in  [Verbal consent obtained from patient] : the patient, [unfilled] [FreeTextEntry4] : 149pm [FreeTextEntry5] : 205pm [FreeTextEntry2] : 6/23, ISTOP checked [FreeTextEntry3] : phone, then video [FreeTextEntry1] : "I am ok with alprazolam for anxiety and insomnia."

## 2025-04-17 NOTE — REASON FOR VISIT
[Patient preference] : as per patient preference [Telehealth (audio & video) - Individual/Group] : This visit was provided via telehealth using real-time 2-way audio visual technology. [Other Location: e.g. Home (Enter Location, City,State)___] : The provider was located at [unfilled]. [Home] : The patient, [unfilled], was located at home, [unfilled], at the time of the visit. [Verbal consent obtained from patient/other participant(s)] : Verbal consent for telehealth/telephonic services obtained from patient/other participant(s) [FreeTextEntry4] : 5:00 PM [FreeTextEntry5] : 5:47 PM [Patient] : Patient [FreeTextEntry1] : First individual therapy visit with the treatment diagnoses of 1) (296.35) (F33.41) Recurrent major depressive disorder, in partial remission 2) (309.81) (F43.10) PTSD (post-traumatic stress disorder) 3) (300.01) (F41.0) Panic disorder 4) (780.52) (G47.09) Other insomnia 5) (305.1) (F17.210) Nicotine dependence, cigarettes, uncomplicated 6) (780.52) (G47.00) Insomnia 7) (300.02) (F41.1) OPAL (generalized anxiety disorder)

## 2025-04-17 NOTE — PLAN
[FreeTextEntry2] : The patient was recently referred to resume individual therapy. The therapist is in the process of assessing the patient's needs for individual psychotherapy and discussing the treatment plan with the patient.  [facility]] : [facility]  [Motivational Interviewing] : Motivational Interviewing  [Supportive Therapy] : Supportive Therapy [de-identified] : Dr. Hernandez recently referred Letitia for individual therapy due to worsening mood and anxiety-related symptoms. The therapist supported the patient in openly sharing her concerning symptoms using validation and reflective listening. Letitia expressed overwhelming feelings due to multiple stressors, including recent changes in her housing, her 's medical issues, her oldest daughter's death, and younger daughter's drug problems. She denied suicidal ideation, homicidal ideation, and self-injurious behavior.  She also denied recent substance use and imminent safety concerns. We will continue to discuss her treatment expectations and plan in the next session.

## 2025-04-22 NOTE — PLAN
[[facility]] : [facility]  [Motivational Interviewing] : Motivational Interviewing  [Supportive Therapy] : Supportive Therapy [FreeTextEntry2] : The patient was recently referred to resume individual therapy. The therapist is in the process of assessing the patient's needs for individual psychotherapy and discussing the treatment plan with the patient.  [de-identified] : Letitia denied worsening symptoms and imminent safety concerns but reported ongoing and worsening mood and anxiety-related symptoms. She confirmed taking all medications as prescribed and following up with Dr. Hernandez regularly as scheduled. We continued discussing the treatment plan, especially her concerning symptoms and treatment expectations. We also completed anxiety and depression screenings using the OPAL-7 and PHQ-9. We will complete the treatment plan in the next session.

## 2025-04-22 NOTE — PLAN
[[facility]] : [facility]  [Motivational Interviewing] : Motivational Interviewing  [Supportive Therapy] : Supportive Therapy [FreeTextEntry2] : The patient was recently referred to resume individual therapy. The therapist is in the process of assessing the patient's needs for individual psychotherapy and discussing the treatment plan with the patient.  [de-identified] : Letitia denied worsening symptoms and imminent safety concerns but reported ongoing and worsening mood and anxiety-related symptoms. She confirmed taking all medications as prescribed and following up with Dr. Hernandez regularly as scheduled. We continued discussing the treatment plan, especially her concerning symptoms and treatment expectations. We also completed anxiety and depression screenings using the OPAL-7 and PHQ-9. We will complete the treatment plan in the next session.

## 2025-04-22 NOTE — PLAN
[[facility]] : [facility]  [Motivational Interviewing] : Motivational Interviewing  [Supportive Therapy] : Supportive Therapy [FreeTextEntry2] : The patient was recently referred to resume individual therapy. The therapist is in the process of assessing the patient's needs for individual psychotherapy and discussing the treatment plan with the patient.  [de-identified] : Letitia denied worsening symptoms and imminent safety concerns but reported ongoing and worsening mood and anxiety-related symptoms. She confirmed taking all medications as prescribed and following up with Dr. Hernandez regularly as scheduled. We continued discussing the treatment plan, especially her concerning symptoms and treatment expectations. We also completed anxiety and depression screenings using the OPAL-7 and PHQ-9. We will complete the treatment plan in the next session.

## 2025-04-22 NOTE — ADDENDUM
[FreeTextEntry1] : The patient's preference for treatment visit types: [ ] In-person  [ x] Telehealth [ ] Hybrid  PRIMARY CARE Dr. Rees  The Last PCP Appt: 12/2024, upcoming 5/29/25 The patient confirmed having regular visits with PCP and denied concern.   MEDICAL CONDITION(S) - Pain management - Blood d/o (Hemophilia) - Possible Lyme disease  SUBSTANCE USE - The patient denied any concerns or problem with substance use. - The patient denied substance-related hx of hospitalization, detox/rehab, or treatment.   SMOKING CESSATION Do you Smoke? [ ] Yes [x] No Do you want to quit? [ ] Yes [ ] No [x] N/A

## 2025-04-22 NOTE — REASON FOR VISIT
[Patient preference] : as per patient preference [Telehealth (audio & video) - Individual/Group] : This visit was provided via telehealth using real-time 2-way audio visual technology. [Other Location: e.g. Home (Enter Location, City,State)___] : The provider was located at [unfilled]. [Home] : The patient, [unfilled], was located at home, [unfilled], at the time of the visit. [Verbal consent obtained from patient/other participant(s)] : Verbal consent for telehealth/telephonic services obtained from patient/other participant(s) [Patient] : Patient [FreeTextEntry4] : 1:00 PM [FreeTextEntry5] : 1:56 PM [FreeTextEntry1] : First individual therapy visit with the treatment diagnoses of 1) (296.35) (F33.41) Recurrent major depressive disorder, in partial remission 2) (309.81) (F43.10) PTSD (post-traumatic stress disorder) 3) (300.01) (F41.0) Panic disorder 4) (780.52) (G47.09) Other insomnia 5) (305.1) (F17.210) Nicotine dependence, cigarettes, uncomplicated 6) (780.52) (G47.00) Insomnia 7) (300.02) (F41.1) OPAL (generalized anxiety disorder)

## 2025-04-28 NOTE — CURRENT PSYCHIATRIC SYMPTOMS
[Insomnia] : insomnia [Excessive Worry] : excessive worry [Depressed Mood] : no depressed mood [Anhedonia] : no anhedonia [Guilt] : not feeling guilty [Decreased Concentration] : no decrease in concentrating ability [Hyperphagia] : no hyperphagia [Hypersomnia] : no ~T hypersomnia [Psychomotor Retardation] : no psychomotor retardation [Anorexia] : no anorexia [Euphoria] : no euphoria [Highly Irritable] : no high irritability [Increased Activity] : no increased in activity [Distractibility] : not distracted [Talkativeness] : no talkativeness [Grandeur] : no feelings of grandeur [Buying Sprees] : no buying sprees [Hypersexuality] : denied hypersexuality [Dec Need For Sleep] : no decreased need for sleep [Delusions] : no ~T delusions [Hallucination Visual] : no visual hallucinations [Hallucination Auditory] : no auditory hallucinations [Hallucination Tactile] : no tactile hallucinations [Thought Disorder] : ~T a thought disorder was not noted [Ruminations] : no rumination disorder [Obsessions] : no obsessions [Re-experiencing] : no re-experiencing [Restlessness] : no restlessness [Hypochondriasis] : no hypochondriasis [Panic] : no panic disorder [de-identified] : chronic, sever [de-identified] : less anxious, no panic attacks

## 2025-04-28 NOTE — REASON FOR VISIT
[Telehealth (audio & video) - Individual/Group] : This visit was provided via telehealth using real-time 2-way audio visual technology. [Patient's space is appropriate for telehealth and maintains privacy/confidentiality.] : Patient's space is appropriate for telehealth and maintains privacy/confidentiality. [Verbal consent obtained from patient/other participant(s)] : Verbal consent for telehealth/telephonic services obtained from patient/other participant(s) [Home] : at home, [unfilled] , at the time of the visit. [Medical Office: ([facility])___] : at the medical office located in  [Verbal consent obtained from patient] : the patient, [unfilled] [FreeTextEntry4] : 150pm [FreeTextEntry5] : 207pm [FreeTextEntry2] : 6/23, ISTOP checked [FreeTextEntry3] : phone, then video [FreeTextEntry1] : "I am ok, started therapy again."

## 2025-04-28 NOTE — ASSESSMENT
[FreeTextEntry1] : Patient with chronic anxiety and insomnia due to psychosocial stressors, ongoing PTSD symptoms.  Discussed safe as possible combination of medications for insomnia as below, to add on 1 medication at a time, never take extra, and make changes in step wise approach   1. melatonin 5mg-10mg at bedtime 2. hydroxyzine 25mg-75mg at bedtime prn 3. alprazolam 1mg BID prn and 2mg-4mg at bedtime  follow up in 4 -5 weeks,

## 2025-04-28 NOTE — HISTORY OF PRESENT ILLNESS
[No] : no [None Known] : none known [Responsibility to family or others] : responsibility to family or others [Identifies reasons for living] : identifies reasons for living [Future oriented] : future oriented [Supportive social network or family] : supportive social network or family [High spirituality] : high spirituality [Positive therapeutic relationships] : positive therapeutic relationships [FreeTextEntry1] : This is a 62 year old patient who presents for medication management.  The patient reports fairly stable mood,  sleep is ok appetite.  The patient denies symptoms of depression,  ezekiel, or psychosis at this time.  The patient reports much less anxiety that impairs their daily functioning. The patient denies any suicidal ideation, homicidal ideation, no auditory or visual hallucinations, or paranoid ideation.  The patient has chronic medical complaints. The patient denies any drug or alcohol use, but is smoking at this time. I requested the patient's updated physical and labs from their PCP.  Coping skills were discussed and a safety plan was provided.  The patient was educated on the risks, benefits, and alternatives of their psychiatric medications.  The patient will try to reengage in psychotherapy at this time.  The patient consents to ongoing medication management.  Discussed sleep hygiene at length.  patient failed trials of trazodone, seroquel, vistaril.   Due to Factor V Leiden mutation, unable to augment with risperdal/olanzapine  Patient provided PHQ9 and Perez Anxiety Scale.  8/30:patient maintains her lorazepam is causing restless leg, prefers to change back to valium, RX sent 10mg in am and 20mg at bedtime  9/13:no changes, at baseline 10/11:will try lowering celexa to 20mg, resume trazodone 100mg-200mg at bedtime for insomnia 11/8:patient will d/c celexa, raise remeron 30mg at bedtime, continue trazodone and valium 12/6:continue current medications 1/13:stable on medications, encouraged to go to ER for medical condition 2/8:see plan below for insomnia treatment 2/14:PA for zoplidem denied, will continue prn valium in day and xanax 1-2mg at bedtime for insomnia (educated that xanax will work better with good sleep hygiene and when valium is used less frequently) understand not to combine at bedtime and not to use with opiates as she has d/c opiates 3/8/23:patient maintains she can not take SSRIs, TCAs, atypicals due to side effects or treatment failure.  Patient educated that reducing anxiety with standing medication may help, but she maintains nightime prns have been only helpful options.  Discussed at great length other possibilities including depakote, prazozin, thorazine.  I also encouraged her to seek a second opinion  3/14:no improvement, now feels haldol was helpful in past, d/c thorazine and start haldol 5mg at bedtime, f/u tomorrow  3/22:haldol provided a little benefit, offrede depakote if able to do labs, states she can't at this time, will continue sleep hygiene and current medications, will increase in haldo, f/u in 3 weeks, earlier as needed, educated need to come in person by 5/11 to continue to receive controlled drugs  6/1:d/c haldol and trazodone due to side effects 6/29:stable on current medications 9/18:continue montly prns for anxiety/insomia 10/24:d/c prasozin 11/21:continue current medications, understands risks with opiates, has used concurrently, does not take any extra medication 1/22:at baseline 2/21:resume additional sleep medication, see plan 3/18:no changes 11/19 see plan 12/17:continue current meds 1/14:no changes, emailed GAD7/PHQ9 2/11:expaine patient just filled 90 2mg xanax 6 days ago, will change to valium at end of month, when next RX is due 3/3:diescussed options considering patient has resume opiates, eventhough she has taken benzos and opiates for many years, we want to try benzo with least risk, discussed option of lorazepam or temazepam or continuing xanax, valium and klonopin should be avoided, patient agreed to 3 day trial of lorazepam, will call on this Friday 3/9:patient failed trial of lorazepam, did not want to try temazepam at this time, understands risks of benzo use with opiates, had been on both in past many years, agree to resume alprazom 2mg po TD prn, discussed safe use at length 3/31: patient uses xanax prn for sleep and occasional prn, requests therapy again, referral made 4/28:improving with therapy [de-identified] : \par  \par

## 2025-05-14 NOTE — PLAN
[[facility]] : [facility]  [Motivational Interviewing] : Motivational Interviewing  [Supportive Therapy] : Supportive Therapy [FreeTextEntry2] : Treatment Goal (effective as of 5/14/25, CGI: 5/14/25) Letitia will gain x3-5 coping skills to regulate her thoughts and emotions effectively and increase CGI score from 5 to 4.   Objective A. Pt will utilize x1-3 coping skills daily and maintain medication management daily as prescribed to regulate thoughts and emotions effectively. Objective B. Pt will discuss x1-2 significant events to process during therapy sessions every 1-4 weeks to increase insight and self-awareness.  Recommended Frequency of Visits: Medication Management: Every 1-3 months Individual Therapy: Every 1-4 weeks [de-identified] : Letitia denied worsening symptoms and imminent safety concerns but reported ongoing and worsening mood and anxiety-related symptoms. She confirmed taking all medications as prescribed and following up with Dr. Hernandez regularly as scheduled. We completed the treatment plan during today's session.

## 2025-05-14 NOTE — PLAN
[[facility]] : [facility]  [Motivational Interviewing] : Motivational Interviewing  [Supportive Therapy] : Supportive Therapy [FreeTextEntry2] : Treatment Goal (effective as of 5/14/25, CGI: 5/14/25) Letitia will gain x3-5 coping skills to regulate her thoughts and emotions effectively and increase CGI score from 5 to 4.   Objective A. Pt will utilize x1-3 coping skills daily and maintain medication management daily as prescribed to regulate thoughts and emotions effectively. Objective B. Pt will discuss x1-2 significant events to process during therapy sessions every 1-4 weeks to increase insight and self-awareness.  Recommended Frequency of Visits: Medication Management: Every 1-3 months Individual Therapy: Every 1-4 weeks [de-identified] : Letitia denied worsening symptoms and imminent safety concerns but reported ongoing and worsening mood and anxiety-related symptoms. She confirmed taking all medications as prescribed and following up with Dr. Hernandez regularly as scheduled. We completed the treatment plan during today's session.

## 2025-05-14 NOTE — PLAN
[[facility]] : [facility]  [Motivational Interviewing] : Motivational Interviewing  [Supportive Therapy] : Supportive Therapy [FreeTextEntry2] : Treatment Goal (effective as of 5/14/25, CGI: 5/14/25) Letitia will gain x3-5 coping skills to regulate her thoughts and emotions effectively and increase CGI score from 5 to 4.   Objective A. Pt will utilize x1-3 coping skills daily and maintain medication management daily as prescribed to regulate thoughts and emotions effectively. Objective B. Pt will discuss x1-2 significant events to process during therapy sessions every 1-4 weeks to increase insight and self-awareness.  Recommended Frequency of Visits: Medication Management: Every 1-3 months Individual Therapy: Every 1-4 weeks [de-identified] : Letitia denied worsening symptoms and imminent safety concerns but reported ongoing and worsening mood and anxiety-related symptoms. She confirmed taking all medications as prescribed and following up with Dr. Hernandez regularly as scheduled. We completed the treatment plan during today's session.

## 2025-05-20 NOTE — PHYSICAL EXAM
[5 - Markedly ill] : 5 - Markedly ill  (intrusive symptoms that distinctly impair social/occupational function or cause intrusive levels of distress)

## 2025-05-21 NOTE — DISCUSSION/SUMMARY
[Initial Plan] : Initial Plan [Motivated to participate in treatment] : motivated to participate in treatment [Involved in cultural/spiritual/Worship/community activities] : involved in cultural/spiritual/Worship/community activities [Housing stability] : housing stability [English fluency] : English fluency [Access to safe outdoor spaces] : access to safe outdoor spaces [Mental Health] : Mental Health [Initial] : Initial [every ___ months] : every [unfilled] months [every ___ weeks] : every [unfilled] weeks [Improvement in symptoms as evidenced by:] : Improvement in symptoms as evidenced by: [A change in level of care is needed as evidenced by:] : A change in level of care is needed as evidenced by: [Other rationale for transition/discharge:] : Other rationale for transition/discharge: [Yes] : Yes [None - Reason others did not participate:] : None - Reason others did not participate:  [Psychiatric Provider/Prescriber] : Psychiatric Provider/Prescriber [Therapist] : Therapist [Supervisor (if needed)] : Supervisor [FreeTextEntry1] : 5/14/2025 [FreeTextEntry2] : 5/14/2026 [FreeTextEntry3] : 6/22/2007 [FreeTextEntry8] : 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  [FreeTextEntry9] : Involved in spiritual activities. [FreeTextEntry4] : -Short-term: Letitia will gain x3-5 coping skills to regulate her thoughts and emotions effectively and increase CGI score from 5 to 4. [de-identified] : Pt will utilize x1-3 coping skills daily and maintain medication management daily as prescribed to regulate thoughts and emotions effectively. [de-identified] : 5/14/2026 [de-identified] : Pt will discuss x1-2 significant events to process during therapy sessions every 1-4 weeks to increase insight and self-awareness.  [de-identified] : 5/14/2026 [FreeTextEntry5] : Acceptance and Commitment Therapy, [facility], Motivational Interviewing, Psychoeducation, and Supportive Therapy [de-identified] : The patient expresses improvement in performing activities of daily living and/or says progress with initial complaint symptoms. In addition, the treatment team will conduct diagnosis-based screenings as needed, such as CGI, PHQ9, GAD7, DASS21, PCL-5, CGI, etc.    [de-identified] : If the patient is unable to perform activities of daily living and/or expresses suicidal ideation, a higher level of care will be considered.  [de-identified] : Other rationales for transition/discharge are granted/applied upon the patient's not communicating with the providers, relocation, self-termination, and/or requests for treatment termination/transfer to another facility. [de-identified] : Pt declined. [de-identified] : Prosper Hernandez,  Peter Biggs ("Jamia"), Isabel Araujo

## 2025-05-21 NOTE — DISCUSSION/SUMMARY
[Initial Plan] : Initial Plan [Motivated to participate in treatment] : motivated to participate in treatment [Involved in cultural/spiritual/Samaritan/community activities] : involved in cultural/spiritual/Samaritan/community activities [Housing stability] : housing stability [English fluency] : English fluency [Access to safe outdoor spaces] : access to safe outdoor spaces [Mental Health] : Mental Health [Initial] : Initial [every ___ months] : every [unfilled] months [every ___ weeks] : every [unfilled] weeks [Improvement in symptoms as evidenced by:] : Improvement in symptoms as evidenced by: [A change in level of care is needed as evidenced by:] : A change in level of care is needed as evidenced by: [Other rationale for transition/discharge:] : Other rationale for transition/discharge: [Yes] : Yes [None - Reason others did not participate:] : None - Reason others did not participate:  [Psychiatric Provider/Prescriber] : Psychiatric Provider/Prescriber [Therapist] : Therapist [Supervisor (if needed)] : Supervisor [FreeTextEntry1] : 5/14/2025 [FreeTextEntry2] : 5/14/2026 [FreeTextEntry3] : 6/22/2007 [FreeTextEntry8] : 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  [FreeTextEntry9] : Involved in spiritual activities. [FreeTextEntry4] : -Short-term: Letitia will gain x3-5 coping skills to regulate her thoughts and emotions effectively and increase CGI score from 5 to 4. [de-identified] : Pt will utilize x1-3 coping skills daily and maintain medication management daily as prescribed to regulate thoughts and emotions effectively. [de-identified] : 5/14/2026 [de-identified] : Pt will discuss x1-2 significant events to process during therapy sessions every 1-4 weeks to increase insight and self-awareness.  [de-identified] : 5/14/2026 [FreeTextEntry5] : Acceptance and Commitment Therapy, [place] [facility], Motivational Interviewing, Psychoeducation, and Supportive Therapy [de-identified] : The patient expresses improvement in performing activities of daily living and/or says progress with initial complaint symptoms. In addition, the treatment team will conduct diagnosis-based screenings as needed, such as CGI, PHQ9, GAD7, DASS21, PCL-5, CGI, etc.    [de-identified] : If the patient is unable to perform activities of daily living and/or expresses suicidal ideation, a higher level of care will be considered.  [de-identified] : Other rationales for transition/discharge are granted/applied upon the patient's not communicating with the providers, relocation, self-termination, and/or requests for treatment termination/transfer to another facility. [de-identified] : Pt declined. [de-identified] : Prosper Hernandez,  Peter Biggs ("Jamia"), Isabel Araujo

## 2025-05-21 NOTE — ADDENDUM
[FreeTextEntry1] : *The treatment plan was completed within 30 days of the first therapy session after the therapy referral.   The patient's preference for treatment visit types: [ ] In-person [ x] Telehealth [ ] Hybrid  PRIMARY CARE Dr. Rees The Last PCP Appt: 12/2024, upcoming 5/29/25 The patient confirmed having regular visits with PCP and denied concern.  MEDICAL CONDITION(S) - Pain management - Blood d/o (Hemophilia) - Possible Lyme disease  SUBSTANCE USE - The patient denied any concerns or problem with substance use. - The patient denied substance-related hx of hospitalization, detox/rehab, or treatment.  SMOKING CESSATION Do you Smoke? [ ] Yes [x] No Do you want to quit? [ ] Yes [ ] No [x] N/A

## 2025-05-21 NOTE — DISCUSSION/SUMMARY
[Initial Plan] : Initial Plan [Motivated to participate in treatment] : motivated to participate in treatment [Involved in cultural/spiritual/Jewish/community activities] : involved in cultural/spiritual/Jewish/community activities [Housing stability] : housing stability [English fluency] : English fluency [Access to safe outdoor spaces] : access to safe outdoor spaces [Mental Health] : Mental Health [Initial] : Initial [every ___ months] : every [unfilled] months [every ___ weeks] : every [unfilled] weeks [Improvement in symptoms as evidenced by:] : Improvement in symptoms as evidenced by: [A change in level of care is needed as evidenced by:] : A change in level of care is needed as evidenced by: [Other rationale for transition/discharge:] : Other rationale for transition/discharge: [Yes] : Yes [None - Reason others did not participate:] : None - Reason others did not participate:  [Psychiatric Provider/Prescriber] : Psychiatric Provider/Prescriber [Therapist] : Therapist [Supervisor (if needed)] : Supervisor [FreeTextEntry1] : 5/14/2025 [FreeTextEntry2] : 5/14/2026 [FreeTextEntry3] : 6/22/2007 [FreeTextEntry8] : 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  [FreeTextEntry9] : Involved in spiritual activities. [FreeTextEntry4] : -Short-term: Letitia will gain x3-5 coping skills to regulate her thoughts and emotions effectively and increase CGI score from 5 to 4. [de-identified] : Pt will utilize x1-3 coping skills daily and maintain medication management daily as prescribed to regulate thoughts and emotions effectively. [de-identified] : 5/14/2026 [de-identified] : Pt will discuss x1-2 significant events to process during therapy sessions every 1-4 weeks to increase insight and self-awareness.  [de-identified] : 5/14/2026 [FreeTextEntry5] : Acceptance and Commitment Therapy, [place] [facility], Motivational Interviewing, Psychoeducation, and Supportive Therapy [de-identified] : The patient expresses improvement in performing activities of daily living and/or says progress with initial complaint symptoms. In addition, the treatment team will conduct diagnosis-based screenings as needed, such as CGI, PHQ9, GAD7, DASS21, PCL-5, CGI, etc.    [de-identified] : If the patient is unable to perform activities of daily living and/or expresses suicidal ideation, a higher level of care will be considered.  [de-identified] : Other rationales for transition/discharge are granted/applied upon the patient's not communicating with the providers, relocation, self-termination, and/or requests for treatment termination/transfer to another facility. [de-identified] : Pt declined. [de-identified] : Prosper Hernandez,  Peter Biggs ("Jamia"), Isabel Araujo

## 2025-06-02 NOTE — HISTORY OF PRESENT ILLNESS
[No] : no [None Known] : none known [Responsibility to family or others] : responsibility to family or others [Identifies reasons for living] : identifies reasons for living [Future oriented] : future oriented [Supportive social network or family] : supportive social network or family [High spirituality] : high spirituality [Positive therapeutic relationships] : positive therapeutic relationships [FreeTextEntry1] : This is a 62 year old patient who presents for medication management.  The patient reports fairly stable mood,  sleep is ok appetite.  The patient denies symptoms of depression,  ezekiel, or psychosis at this time.  The patient reports much less anxiety that impairs their daily functioning. The patient denies any suicidal ideation, homicidal ideation, no auditory or visual hallucinations, or paranoid ideation.  The patient has chronic medical complaints. The patient denies any drug or alcohol use, but is smoking at this time. I requested the patient's updated physical and labs from their PCP.  Coping skills were discussed and a safety plan was provided.  The patient was educated on the risks, benefits, and alternatives of their psychiatric medications.  The patient will try to reengage in psychotherapy at this time.  The patient consents to ongoing medication management.  Discussed sleep hygiene at length.  patient failed trials of trazodone, seroquel, vistaril.   Due to Factor V Leiden mutation, unable to augment with risperdal/olanzapine  Patient provided PHQ9 and Perez Anxiety Scale.  8/30:patient maintains her lorazepam is causing restless leg, prefers to change back to valium, RX sent 10mg in am and 20mg at bedtime  9/13:no changes, at baseline 10/11:will try lowering celexa to 20mg, resume trazodone 100mg-200mg at bedtime for insomnia 11/8:patient will d/c celexa, raise remeron 30mg at bedtime, continue trazodone and valium 12/6:continue current medications 1/13:stable on medications, encouraged to go to ER for medical condition 2/8:see plan below for insomnia treatment 2/14:PA for zoplidem denied, will continue prn valium in day and xanax 1-2mg at bedtime for insomnia (educated that xanax will work better with good sleep hygiene and when valium is used less frequently) understand not to combine at bedtime and not to use with opiates as she has d/c opiates 3/8/23:patient maintains she can not take SSRIs, TCAs, atypicals due to side effects or treatment failure.  Patient educated that reducing anxiety with standing medication may help, but she maintains nightime prns have been only helpful options.  Discussed at great length other possibilities including depakote, prazozin, thorazine.  I also encouraged her to seek a second opinion  3/14:no improvement, now feels haldol was helpful in past, d/c thorazine and start haldol 5mg at bedtime, f/u tomorrow  3/22:haldol provided a little benefit, offrede depakote if able to do labs, states she can't at this time, will continue sleep hygiene and current medications, will increase in haldo, f/u in 3 weeks, earlier as needed, educated need to come in person by 5/11 to continue to receive controlled drugs  6/1:d/c haldol and trazodone due to side effects 6/29:stable on current medications 9/18:continue montly prns for anxiety/insomia 10/24:d/c prasozin 11/21:continue current medications, understands risks with opiates, has used concurrently, does not take any extra medication 1/22:at baseline 2/21:resume additional sleep medication, see plan 3/18:no changes 11/19 see plan 12/17:continue current meds 1/14:no changes, emailed GAD7/PHQ9 2/11:expaine patient just filled 90 2mg xanax 6 days ago, will change to valium at end of month, when next RX is due 3/3:diescussed options considering patient has resume opiates, eventhough she has taken benzos and opiates for many years, we want to try benzo with least risk, discussed option of lorazepam or temazepam or continuing xanax, valium and klonopin should be avoided, patient agreed to 3 day trial of lorazepam, will call on this Friday 3/9:patient failed trial of lorazepam, did not want to try temazepam at this time, understands risks of benzo use with opiates, had been on both in past many years, agree to resume alprazom 2mg po TD prn, discussed safe use at length 3/31: patient uses xanax prn for sleep and occasional prn, requests therapy again, referral made 4/28:improving with therapy [de-identified] : \par  \par

## 2025-06-02 NOTE — REASON FOR VISIT
[Telehealth (audio & video) - Individual/Group] : This visit was provided via telehealth using real-time 2-way audio visual technology. [Patient's space is appropriate for telehealth and maintains privacy/confidentiality.] : Patient's space is appropriate for telehealth and maintains privacy/confidentiality. [Verbal consent obtained from patient/other participant(s)] : Verbal consent for telehealth/telephonic services obtained from patient/other participant(s) [Home] : at home, [unfilled] , at the time of the visit. [Medical Office: ([facility])___] : at the medical office located in  [Verbal consent obtained from patient] : the patient, [unfilled] [FreeTextEntry4] : 200pm [FreeTextEntry5] : 215pm [FreeTextEntry2] : 6/23, ISTOP checked [FreeTextEntry3] : phone, then video [FreeTextEntry1] : "I am ok, [place] be out of the country."
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
## 2025-06-02 NOTE — CURRENT PSYCHIATRIC SYMPTOMS
[Insomnia] : insomnia [Excessive Worry] : excessive worry [Depressed Mood] : no depressed mood [Anhedonia] : no anhedonia [Guilt] : not feeling guilty [Decreased Concentration] : no decrease in concentrating ability [Hyperphagia] : no hyperphagia [Hypersomnia] : no ~T hypersomnia [Psychomotor Retardation] : no psychomotor retardation [Anorexia] : no anorexia [Euphoria] : no euphoria [Highly Irritable] : no high irritability [Increased Activity] : no increased in activity [Distractibility] : not distracted [Talkativeness] : no talkativeness [Grandeur] : no feelings of grandeur [Buying Sprees] : no buying sprees [Hypersexuality] : denied hypersexuality [Dec Need For Sleep] : no decreased need for sleep [Delusions] : no ~T delusions [Hallucination Visual] : no visual hallucinations [Hallucination Auditory] : no auditory hallucinations [Hallucination Tactile] : no tactile hallucinations [Thought Disorder] : ~T a thought disorder was not noted [Ruminations] : no rumination disorder [Obsessions] : no obsessions [Re-experiencing] : no re-experiencing [Restlessness] : no restlessness [Hypochondriasis] : no hypochondriasis [Panic] : no panic disorder [de-identified] : chronic, sever [de-identified] : less anxious, no panic attacks

## 2025-06-11 NOTE — PHYSICAL EXAM
[5 - Markedly ill] : 5 - Markedly ill  (intrusive symptoms that distinctly impair social/occupational function or cause intrusive levels of distress)
Cardiac

## 2025-06-12 NOTE — REASON FOR VISIT
[Patient preference] : as per patient preference [Telehealth (audio & video) - Individual/Group] : This visit was provided via telehealth using real-time 2-way audio visual technology. [Other Location: e.g. Home (Enter Location, City,State)___] : The patient, [unfilled], was located at [unfilled] at the time of the visit. [Verbal consent obtained from patient/other participant(s)] : Verbal consent for telehealth/telephonic services obtained from patient/other participant(s) [Patient] : Patient [FreeTextEntry4] : 5:02 PM [FreeTextEntry5] : 5:40 PM [FreeTextEntry1] : First individual therapy visit with the treatment diagnoses of 1) (296.35) (F33.41) Recurrent major depressive disorder, in partial remission 2) (309.81) (F43.10) PTSD (post-traumatic stress disorder) 3) (300.01) (F41.0) Panic disorder 4) (780.52) (G47.09) Other insomnia 5) (305.1) (F17.210) Nicotine dependence, cigarettes, uncomplicated 6) (780.52) (G47.00) Insomnia 7) (300.02) (F41.1) OPAL (generalized anxiety disorder)

## 2025-06-12 NOTE — PLAN
[[facility]] : [facility]  [Motivational Interviewing] : Motivational Interviewing  [Supportive Therapy] : Supportive Therapy [FreeTextEntry2] : Treatment Goal (effective as of 5/14/25, CGI: 5/14/25) Letitia will gain x3-5 coping skills to regulate her thoughts and emotions effectively and increase CGI score from 5 to 4.   Objective A. Pt will utilize x1-3 coping skills daily and maintain medication management daily as prescribed to regulate thoughts and emotions effectively. Objective B. Pt will discuss x1-2 significant events to process during therapy sessions every 1-4 weeks to increase insight and self-awareness.  Recommended Frequency of Visits: Medication Management: Every 1-3 months Individual Therapy: Every 1-4 weeks [de-identified] : Letitia is building x3-5 coping skills to regulate her thoughts and emotions effectively. The patient is attempting to manage multiple life stressors, including adjusting to her limited financial and new housing situations, involvement in caring for elderly parents, caring for her ex- with medical conditions, dealing with the unresolved/suspicious death of her older daughter, and worrying about her younger daughter, who developed drug problems and living on the street.   Letitia endorsed high anxiety and denied worsening symptoms and imminent safety concerns but reported feeling overwhelmed dealing with multiple problems, most lately her father's hospitalization-related stress. The therapist supported the patient in openly expressing her recent stressors, thoughts, and feelings using validation and reflective listening. The therapist supported the patient in identifying her top three essential tasks that she needed to focus on to utilize her resources effectively, considering her time, physical energy, and emotional tolerance. Also, we reviewed and practiced tension-release exercises to reduce physical tension. Letitia will redirect her attention to her top three tasks to prevent burnout (x3, daily) and practice tension-release exercises (x2-5, daily) to reduce physical tension. We will have a follow-up discussion in the next session.    Letitia is moving toward her treatment goal by denying worsening symptoms, taking her medications as prescribed daily, staying in communication with the providers to maintain her treatment, and attempting to attend her scheduled appointments (medication management every 1-3 months and individual therapy every 1-4 weeks).  Behavioral Practice Tasks/Coping Skills (In progress): - Focusing on a top three tasks to prevent burnout. - Tension-release relaxation exercises: Shrug shoulder to create tension (inhale) and slowly release to relax (exhale), x3 each set.  Follow-up: - Return in 4 week(s), The therapist offer sooner dates, but the patient declined due to schedule conflict.

## 2025-06-12 NOTE — PLAN
[[facility]] : [facility]  [Motivational Interviewing] : Motivational Interviewing  [Supportive Therapy] : Supportive Therapy [FreeTextEntry2] : Treatment Goal (effective as of 5/14/25, CGI: 5/14/25) Letitia will gain x3-5 coping skills to regulate her thoughts and emotions effectively and increase CGI score from 5 to 4.   Objective A. Pt will utilize x1-3 coping skills daily and maintain medication management daily as prescribed to regulate thoughts and emotions effectively. Objective B. Pt will discuss x1-2 significant events to process during therapy sessions every 1-4 weeks to increase insight and self-awareness.  Recommended Frequency of Visits: Medication Management: Every 1-3 months Individual Therapy: Every 1-4 weeks [de-identified] : Letitia is building x3-5 coping skills to regulate her thoughts and emotions effectively. The patient is attempting to manage multiple life stressors, including adjusting to her limited financial and new housing situations, involvement in caring for elderly parents, caring for her ex- with medical conditions, dealing with the unresolved/suspicious death of her older daughter, and worrying about her younger daughter, who developed drug problems and living on the street.   Letitia endorsed high anxiety and denied worsening symptoms and imminent safety concerns but reported feeling overwhelmed dealing with multiple problems, most lately her father's hospitalization-related stress. The therapist supported the patient in openly expressing her recent stressors, thoughts, and feelings using validation and reflective listening. The therapist supported the patient in identifying her top three essential tasks that she needed to focus on to utilize her resources effectively, considering her time, physical energy, and emotional tolerance. Also, we reviewed and practiced tension-release exercises to reduce physical tension. Letitia will redirect her attention to her top three tasks to prevent burnout (x3, daily) and practice tension-release exercises (x2-5, daily) to reduce physical tension. We will have a follow-up discussion in the next session.    Letitia is moving toward her treatment goal by denying worsening symptoms, taking her medications as prescribed daily, staying in communication with the providers to maintain her treatment, and attempting to attend her scheduled appointments (medication management every 1-3 months and individual therapy every 1-4 weeks).  Behavioral Practice Tasks/Coping Skills (In progress): - Focusing on a top three tasks to prevent burnout. - Tension-release relaxation exercises: Shrug shoulder to create tension (inhale) and slowly release to relax (exhale), x3 each set.  Follow-up: - Return in 4 week(s), The therapist offer sooner dates, but the patient declined due to schedule conflict.

## 2025-06-12 NOTE — PLAN
[[facility]] : [facility]  [Motivational Interviewing] : Motivational Interviewing  [Supportive Therapy] : Supportive Therapy [FreeTextEntry2] : Treatment Goal (effective as of 5/14/25, CGI: 5/14/25) Letitia will gain x3-5 coping skills to regulate her thoughts and emotions effectively and increase CGI score from 5 to 4.   Objective A. Pt will utilize x1-3 coping skills daily and maintain medication management daily as prescribed to regulate thoughts and emotions effectively. Objective B. Pt will discuss x1-2 significant events to process during therapy sessions every 1-4 weeks to increase insight and self-awareness.  Recommended Frequency of Visits: Medication Management: Every 1-3 months Individual Therapy: Every 1-4 weeks [de-identified] : Letitia is building x3-5 coping skills to regulate her thoughts and emotions effectively. The patient is attempting to manage multiple life stressors, including adjusting to her limited financial and new housing situations, involvement in caring for elderly parents, caring for her ex- with medical conditions, dealing with the unresolved/suspicious death of her older daughter, and worrying about her younger daughter, who developed drug problems and living on the street.   Letitia endorsed high anxiety and denied worsening symptoms and imminent safety concerns but reported feeling overwhelmed dealing with multiple problems, most lately her father's hospitalization-related stress. The therapist supported the patient in openly expressing her recent stressors, thoughts, and feelings using validation and reflective listening. The therapist supported the patient in identifying her top three essential tasks that she needed to focus on to utilize her resources effectively, considering her time, physical energy, and emotional tolerance. Also, we reviewed and practiced tension-release exercises to reduce physical tension. Letitia will redirect her attention to her top three tasks to prevent burnout (x3, daily) and practice tension-release exercises (x2-5, daily) to reduce physical tension. We will have a follow-up discussion in the next session.    Letitia is moving toward her treatment goal by denying worsening symptoms, taking her medications as prescribed daily, staying in communication with the providers to maintain her treatment, and attempting to attend her scheduled appointments (medication management every 1-3 months and individual therapy every 1-4 weeks).  Behavioral Practice Tasks/Coping Skills (In progress): - Focusing on a top three tasks to prevent burnout. - Tension-release relaxation exercises: Shrug shoulder to create tension (inhale) and slowly release to relax (exhale), x3 each set.  Follow-up: - Return in 4 week(s), The therapist offer sooner dates, but the patient declined due to schedule conflict.

## 2025-07-17 NOTE — HISTORY OF PRESENT ILLNESS
[No] : no [None Known] : none known [Responsibility to family or others] : responsibility to family or others [Identifies reasons for living] : identifies reasons for living [Future oriented] : future oriented [Supportive social network or family] : supportive social network or family [High spirituality] : high spirituality [Positive therapeutic relationships] : positive therapeutic relationships [FreeTextEntry1] : This is a 62 year old patient who presents for medication management.  The patient reports fairly stable mood,  sleep is ok appetite.  The patient denies symptoms of depression,  ezekiel, or psychosis at this time.  The patient reports much less anxiety that impairs their daily functioning. The patient denies any suicidal ideation, homicidal ideation, no auditory or visual hallucinations, or paranoid ideation.  The patient has chronic medical complaints. The patient denies any drug or alcohol use, but is smoking at this time. I requested the patient's updated physical and labs from their PCP.  Coping skills were discussed and a safety plan was provided.  The patient was educated on the risks, benefits, and alternatives of their psychiatric medications.  The patient will try to reengage in psychotherapy at this time.  The patient consents to ongoing medication management.  Discussed sleep hygiene at length.  patient failed trials of trazodone, seroquel, vistaril.   Due to Factor V Leiden mutation, unable to augment with risperdal/olanzapine  Patient provided PHQ9 and Perez Anxiety Scale.  8/30:patient maintains her lorazepam is causing restless leg, prefers to change back to valium, RX sent 10mg in am and 20mg at bedtime  9/13:no changes, at baseline 10/11:will try lowering celexa to 20mg, resume trazodone 100mg-200mg at bedtime for insomnia 11/8:patient will d/c celexa, raise remeron 30mg at bedtime, continue trazodone and valium 12/6:continue current medications 1/13:stable on medications, encouraged to go to ER for medical condition 2/8:see plan below for insomnia treatment 2/14:PA for zoplidem denied, will continue prn valium in day and xanax 1-2mg at bedtime for insomnia (educated that xanax will work better with good sleep hygiene and when valium is used less frequently) understand not to combine at bedtime and not to use with opiates as she has d/c opiates 3/8/23:patient maintains she can not take SSRIs, TCAs, atypicals due to side effects or treatment failure.  Patient educated that reducing anxiety with standing medication may help, but she maintains nightime prns have been only helpful options.  Discussed at great length other possibilities including depakote, prazozin, thorazine.  I also encouraged her to seek a second opinion  3/14:no improvement, now feels haldol was helpful in past, d/c thorazine and start haldol 5mg at bedtime, f/u tomorrow  3/22:haldol provided a little benefit, offrede depakote if able to do labs, states she can't at this time, will continue sleep hygiene and current medications, will increase in haldo, f/u in 3 weeks, earlier as needed, educated need to come in person by 5/11 to continue to receive controlled drugs  6/1:d/c haldol and trazodone due to side effects 6/29:stable on current medications 9/18:continue montly prns for anxiety/insomia 10/24:d/c prasozin 11/21:continue current medications, understands risks with opiates, has used concurrently, does not take any extra medication 1/22:at baseline 2/21:resume additional sleep medication, see plan 3/18:no changes 11/19 see plan 12/17:continue current meds 1/14:no changes, emailed GAD7/PHQ9 2/11:expaine patient just filled 90 2mg xanax 6 days ago, will change to valium at end of month, when next RX is due 3/3:diescussed options considering patient has resume opiates, eventhough she has taken benzos and opiates for many years, we want to try benzo with least risk, discussed option of lorazepam or temazepam or continuing xanax, valium and klonopin should be avoided, patient agreed to 3 day trial of lorazepam, will call on this Friday 3/9:patient failed trial of lorazepam, did not want to try temazepam at this time, understands risks of benzo use with opiates, had been on both in past many years, agree to resume alprazom 2mg po TD prn, discussed safe use at length 3/31: patient uses xanax prn for sleep and occasional prn, requests therapy again, referral made 4/28:improving with therapy [de-identified] : \par  \par

## 2025-07-17 NOTE — CURRENT PSYCHIATRIC SYMPTOMS
[Insomnia] : insomnia [Excessive Worry] : excessive worry [Depressed Mood] : no depressed mood [Anhedonia] : no anhedonia [Guilt] : not feeling guilty [Decreased Concentration] : no decrease in concentrating ability [Hyperphagia] : no hyperphagia [Hypersomnia] : no ~T hypersomnia [Psychomotor Retardation] : no psychomotor retardation [Anorexia] : no anorexia [Euphoria] : no euphoria [Highly Irritable] : no high irritability [Increased Activity] : no increased in activity [Distractibility] : not distracted [Talkativeness] : no talkativeness [Grandeur] : no feelings of grandeur [Buying Sprees] : no buying sprees [Hypersexuality] : denied hypersexuality [Dec Need For Sleep] : no decreased need for sleep [Delusions] : no ~T delusions [Hallucination Visual] : no visual hallucinations [Hallucination Auditory] : no auditory hallucinations [Hallucination Tactile] : no tactile hallucinations [Thought Disorder] : ~T a thought disorder was not noted [Ruminations] : no rumination disorder [Obsessions] : no obsessions [Re-experiencing] : no re-experiencing [Restlessness] : no restlessness [Hypochondriasis] : no hypochondriasis [Panic] : no panic disorder [de-identified] : chronic, sever [de-identified] : less anxious, no panic attacks

## 2025-07-17 NOTE — PHYSICAL EXAM
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

## 2025-07-17 NOTE — HISTORY OF PRESENT ILLNESS
[No] : no [None Known] : none known [Responsibility to family or others] : responsibility to family or others [Identifies reasons for living] : identifies reasons for living [Future oriented] : future oriented [Supportive social network or family] : supportive social network or family [High spirituality] : high spirituality [Positive therapeutic relationships] : positive therapeutic relationships [FreeTextEntry1] : This is a 62 year old patient who presents for medication management.  The patient reports fairly stable mood,  sleep is ok appetite.  The patient denies symptoms of depression,  ezekiel, or psychosis at this time.  The patient reports much less anxiety that impairs their daily functioning. The patient denies any suicidal ideation, homicidal ideation, no auditory or visual hallucinations, or paranoid ideation.  The patient has chronic medical complaints. The patient denies any drug or alcohol use, but is smoking at this time. I requested the patient's updated physical and labs from their PCP.  Coping skills were discussed and a safety plan was provided.  The patient was educated on the risks, benefits, and alternatives of their psychiatric medications.  The patient will try to reengage in psychotherapy at this time.  The patient consents to ongoing medication management.  Discussed sleep hygiene at length.  patient failed trials of trazodone, seroquel, vistaril.   Due to Factor V Leiden mutation, unable to augment with risperdal/olanzapine  Patient provided PHQ9 and Perez Anxiety Scale.  8/30:patient maintains her lorazepam is causing restless leg, prefers to change back to valium, RX sent 10mg in am and 20mg at bedtime  9/13:no changes, at baseline 10/11:will try lowering celexa to 20mg, resume trazodone 100mg-200mg at bedtime for insomnia 11/8:patient will d/c celexa, raise remeron 30mg at bedtime, continue trazodone and valium 12/6:continue current medications 1/13:stable on medications, encouraged to go to ER for medical condition 2/8:see plan below for insomnia treatment 2/14:PA for zoplidem denied, will continue prn valium in day and xanax 1-2mg at bedtime for insomnia (educated that xanax will work better with good sleep hygiene and when valium is used less frequently) understand not to combine at bedtime and not to use with opiates as she has d/c opiates 3/8/23:patient maintains she can not take SSRIs, TCAs, atypicals due to side effects or treatment failure.  Patient educated that reducing anxiety with standing medication may help, but she maintains nightime prns have been only helpful options.  Discussed at great length other possibilities including depakote, prazozin, thorazine.  I also encouraged her to seek a second opinion  3/14:no improvement, now feels haldol was helpful in past, d/c thorazine and start haldol 5mg at bedtime, f/u tomorrow  3/22:haldol provided a little benefit, offrede depakote if able to do labs, states she can't at this time, will continue sleep hygiene and current medications, will increase in haldo, f/u in 3 weeks, earlier as needed, educated need to come in person by 5/11 to continue to receive controlled drugs  6/1:d/c haldol and trazodone due to side effects 6/29:stable on current medications 9/18:continue montly prns for anxiety/insomia 10/24:d/c prasozin 11/21:continue current medications, understands risks with opiates, has used concurrently, does not take any extra medication 1/22:at baseline 2/21:resume additional sleep medication, see plan 3/18:no changes 11/19 see plan 12/17:continue current meds 1/14:no changes, emailed GAD7/PHQ9 2/11:expaine patient just filled 90 2mg xanax 6 days ago, will change to valium at end of month, when next RX is due 3/3:diescussed options considering patient has resume opiates, eventhough she has taken benzos and opiates for many years, we want to try benzo with least risk, discussed option of lorazepam or temazepam or continuing xanax, valium and klonopin should be avoided, patient agreed to 3 day trial of lorazepam, will call on this Friday 3/9:patient failed trial of lorazepam, did not want to try temazepam at this time, understands risks of benzo use with opiates, had been on both in past many years, agree to resume alprazom 2mg po TD prn, discussed safe use at length 3/31: patient uses xanax prn for sleep and occasional prn, requests therapy again, referral made 4/28:improving with therapy [de-identified] : \par  \par

## 2025-07-17 NOTE — REASON FOR VISIT
[Telehealth (audio & video) - Individual/Group] : This visit was provided via telehealth using real-time 2-way audio visual technology. [Patient's space is appropriate for telehealth and maintains privacy/confidentiality.] : Patient's space is appropriate for telehealth and maintains privacy/confidentiality. [Verbal consent obtained from patient/other participant(s)] : Verbal consent for telehealth/telephonic services obtained from patient/other participant(s) [Home] : at home, [unfilled] , at the time of the visit. [Medical Office: ([facility])___] : at the medical office located in  [Verbal consent obtained from patient] : the patient, [unfilled] [FreeTextEntry4] : 200pm [FreeTextEntry5] : 215pm [FreeTextEntry2] : 6/23, ISTOP checked [FreeTextEntry3] : phone, then video [FreeTextEntry1] : "I am much better."

## 2025-07-17 NOTE — DISCUSSION/SUMMARY
[Date of Last Physical Exam: _____] : Date of Last Physical Exam: [unfilled] [Date of Last Annual Labs: _____] : Date of Last Annual Labs: [unfilled] [Annual Review of Systems Completed?] : Annual Review of Systems Completed: Yes [Tobacco Screening Completed?] : Tobacco Screening Completed: Yes [Date of Last HbgA1c: _____] : Date of Last HbgA1c: [unfilled] [Date of Last Lipid Profile: _____] : Date of Last Lipid Profile: [unfilled] [No] : No [Yes: Details:___] : Yes: [unfilled] [Completed, copy requested] : Completed, copy requested [Ordered] : Ordered [Yes] : Yes [Patient is not prescribed antipsychotic medication] : Patient is not prescribed antipsychotic medication [Does patient use tobacco products?] : Patient uses tobacco products [Patient offered brief counseling regarding smoking cessation] : patient offered brief counseling regarding smoking cessation [Does patient use medical marijuana?] : Patient uses medical marijuana. [Patient has been tested for HIV?] : Patient has not been tested for HIV [Patient has been tested for Hepatitis?] : Patient has not been tested for hepatitis [Patient would like to be tested/re-tested?] : patient would not like to be tested/re-tested [Unknown] : Unknown [Current or past COVID-19 diagnosis?] : Patient does not have a current or past COVID-19 diagnosis [Vaccinated?] : is not vaccinated [Education provided about COVID-19?] : Education provided about COVID-19 [Potential impact of patientÃ¢Â?Â?s physical health conditions on psychiatric care?] : Potential impact of patient's physical health conditions on psychiatric care: No [Does patient require any additional health services or referrals?] : Does patient require any additional health services or referrals: No

## 2025-07-17 NOTE — REASON FOR VISIT
[Telehealth (audio & video) - Individual/Group] : This visit was provided via telehealth using real-time 2-way audio visual technology. [Patient's space is appropriate for telehealth and maintains privacy/confidentiality.] : Patient's space is appropriate for telehealth and maintains privacy/confidentiality. [Verbal consent obtained from patient/other participant(s)] : Verbal consent for telehealth/telephonic services obtained from patient/other participant(s) [Home] : at home, [unfilled] , at the time of the visit. [Medical Office: ([facility])___] : at the medical office located in  [Verbal consent obtained from patient] : the patient, [unfilled] [FreeTextEntry4] : 200pm [FreeTextEntry5] : 215pm [FreeTextEntry2] : 6/23, ISTOP checked [FreeTextEntry3] : phone, then video [FreeTextEntry1] : "I am much better."

## 2025-07-17 NOTE — CURRENT PSYCHIATRIC SYMPTOMS
[Insomnia] : insomnia [Excessive Worry] : excessive worry [Depressed Mood] : no depressed mood [Anhedonia] : no anhedonia [Guilt] : not feeling guilty [Decreased Concentration] : no decrease in concentrating ability [Hyperphagia] : no hyperphagia [Hypersomnia] : no ~T hypersomnia [Psychomotor Retardation] : no psychomotor retardation [Anorexia] : no anorexia [Euphoria] : no euphoria [Highly Irritable] : no high irritability [Increased Activity] : no increased in activity [Distractibility] : not distracted [Talkativeness] : no talkativeness [Grandeur] : no feelings of grandeur [Buying Sprees] : no buying sprees [Hypersexuality] : denied hypersexuality [Dec Need For Sleep] : no decreased need for sleep [Delusions] : no ~T delusions [Hallucination Visual] : no visual hallucinations [Hallucination Auditory] : no auditory hallucinations [Hallucination Tactile] : no tactile hallucinations [Thought Disorder] : ~T a thought disorder was not noted [Ruminations] : no rumination disorder [Obsessions] : no obsessions [Re-experiencing] : no re-experiencing [Restlessness] : no restlessness [Hypochondriasis] : no hypochondriasis [Panic] : no panic disorder [de-identified] : chronic, sever [de-identified] : less anxious, no panic attacks

## 2025-07-23 NOTE — PLAN
[FreeTextEntry2] : Treatment Goal (effective as of 5/14/25, CGI: 5/14/25) Letitia will gain x3-5 coping skills to regulate her thoughts and emotions effectively and increase CGI score from 5 to 4.   Objective A. Pt will utilize x1-3 coping skills daily and maintain medication management daily as prescribed to regulate thoughts and emotions effectively. Objective B. Pt will discuss x1-2 significant events to process during therapy sessions every 1-4 weeks to increase insight and self-awareness.  Recommended Frequency of Visits: Medication Management: Every 1-3 months Individual Therapy: Every 1-4 weeks [Meta Therapy] : Meta Therapy  [Motivational Interviewing] : Motivational Interviewing  [Supportive Therapy] : Supportive Therapy [de-identified] : Letitia is building x3-5 coping skills to regulate her thoughts and emotions effectively. The patient is attempting to manage multiple life stressors, including adjusting to her limited financial and new housing situations, involvement in caring for elderly parents, caring for her ex- with medical conditions, dealing with the unresolved/suspicious death of her older daughter, and worrying about her younger daughter, who developed drug problems and living on the street.   Letitia endorsed high anxiety but denied worsening symptoms or imminent safety concerns, reporting feeling overwhelmed dealing with multiple problems with limited support. Most recently, both her parents were hospitalized and then discharged to a nursing home, and her daughter was arrested and needs to follow up with the courts. She felt like she was doing everything by herself and felt lonely, so she agreed to meet weekly. The therapist supported her in openly expressing her recent stressors, thoughts, and feelings using validation and reflective listening. The therapist also supported her in identifying reasons to focus on self-care. We reviewed her essential tasks until the next session. There was nothing urgent that she could control, so she will focus on self-care, especially eating, drinking, and resting as frequently as possible. We also reviewed and practiced tension-release exercises to reduce physical tension. Letitia will redirect her attention to her top three tasks (if any arise) to prevent burnout (three times daily) and will practice tension-release exercises (two to five times daily) to reduce physical tension. We will have a follow-up discussion in our next session.    Letitia is moving toward her treatment goal by denying worsening symptoms, taking her medications as prescribed daily, staying in communication with the providers to maintain her treatment, and attempting to attend her scheduled appointments (medication management every 1-3 months and individual therapy every 1-4 weeks).  Behavioral Practice Tasks/Coping Skills (In progress): - Focusing on a top three tasks to prevent burnout. - Tension-release relaxation exercises: Shrug shoulder to create tension (inhale) and slowly release to relax (exhale), x3 each set.  Follow-up: - Return in 1 week(s).

## 2025-07-23 NOTE — REASON FOR VISIT
[Patient preference] : as per patient preference [Telehealth (audio & video) - Individual/Group] : This visit was provided via telehealth using real-time 2-way audio visual technology. [Other Location: e.g. Home (Enter Location, City,State)___] : The patient, [unfilled], was located at [unfilled] at the time of the visit. [Verbal consent obtained from patient/other participant(s)] : Verbal consent for telehealth/telephonic services obtained from patient/other participant(s) [FreeTextEntry4] : 4:26 PM [FreeTextEntry5] : 5:46 PM [Patient] : Patient [FreeTextEntry1] : First individual therapy visit with the treatment diagnoses of 1) (296.35) (F33.41) Recurrent major depressive disorder, in partial remission 2) (309.81) (F43.10) PTSD (post-traumatic stress disorder) 3) (300.01) (F41.0) Panic disorder 4) (780.52) (G47.09) Other insomnia 5) (305.1) (F17.210) Nicotine dependence, cigarettes, uncomplicated 6) (780.52) (G47.00) Insomnia 7) (300.02) (F41.1) OPAL (generalized anxiety disorder)